# Patient Record
Sex: FEMALE | Race: WHITE | NOT HISPANIC OR LATINO | Employment: FULL TIME | URBAN - METROPOLITAN AREA
[De-identification: names, ages, dates, MRNs, and addresses within clinical notes are randomized per-mention and may not be internally consistent; named-entity substitution may affect disease eponyms.]

---

## 2017-02-14 ENCOUNTER — GENERIC CONVERSION - ENCOUNTER (OUTPATIENT)
Dept: OTHER | Facility: OTHER | Age: 22
End: 2017-02-14

## 2017-02-20 ENCOUNTER — ALLSCRIPTS OFFICE VISIT (OUTPATIENT)
Dept: PERINATAL CARE | Facility: CLINIC | Age: 22
End: 2017-02-20
Payer: COMMERCIAL

## 2017-02-20 ENCOUNTER — GENERIC CONVERSION - ENCOUNTER (OUTPATIENT)
Dept: OTHER | Facility: OTHER | Age: 22
End: 2017-02-20

## 2017-02-20 PROCEDURE — 76805 OB US >/= 14 WKS SNGL FETUS: CPT | Performed by: OBSTETRICS & GYNECOLOGY

## 2017-02-20 PROCEDURE — 76817 TRANSVAGINAL US OBSTETRIC: CPT | Performed by: OBSTETRICS & GYNECOLOGY

## 2017-04-10 ENCOUNTER — GENERIC CONVERSION - ENCOUNTER (OUTPATIENT)
Dept: OTHER | Facility: OTHER | Age: 22
End: 2017-04-10

## 2017-04-10 ENCOUNTER — ALLSCRIPTS OFFICE VISIT (OUTPATIENT)
Dept: PERINATAL CARE | Facility: CLINIC | Age: 22
End: 2017-04-10
Payer: COMMERCIAL

## 2017-04-10 PROCEDURE — 76816 OB US FOLLOW-UP PER FETUS: CPT | Performed by: OBSTETRICS & GYNECOLOGY

## 2017-06-26 ENCOUNTER — HOSPITAL ENCOUNTER (OUTPATIENT)
Facility: HOSPITAL | Age: 22
Discharge: HOME | End: 2017-06-26
Attending: OBSTETRICS & GYNECOLOGY | Admitting: OBSTETRICS & GYNECOLOGY
Payer: COMMERCIAL

## 2017-06-26 VITALS
DIASTOLIC BLOOD PRESSURE: 71 MMHG | HEART RATE: 76 BPM | TEMPERATURE: 99.1 F | SYSTOLIC BLOOD PRESSURE: 134 MMHG | RESPIRATION RATE: 20 BRPM

## 2017-06-26 DIAGNOSIS — N89.8 VAGINAL DISCHARGE DURING PREGNANCY, THIRD TRIMESTER: ICD-10-CM

## 2017-06-26 DIAGNOSIS — Z3A.36 36 WEEKS GESTATION OF PREGNANCY: ICD-10-CM

## 2017-06-26 DIAGNOSIS — O26.893 VAGINAL DISCHARGE DURING PREGNANCY, THIRD TRIMESTER: ICD-10-CM

## 2017-06-26 PROCEDURE — G0463 HOSPITAL OUTPT CLINIC VISIT: HCPCS

## 2017-06-26 PROCEDURE — 99213 OFFICE O/P EST LOW 20 MIN: CPT

## 2017-06-28 ENCOUNTER — ANESTHESIA EVENT (OUTPATIENT)
Dept: LABOR AND DELIVERY | Facility: HOSPITAL | Age: 22
End: 2017-06-28
Payer: COMMERCIAL

## 2017-06-28 ENCOUNTER — ANESTHESIA (OUTPATIENT)
Dept: LABOR AND DELIVERY | Facility: HOSPITAL | Age: 22
End: 2017-06-28
Payer: COMMERCIAL

## 2017-06-28 ENCOUNTER — HOSPITAL ENCOUNTER (INPATIENT)
Facility: HOSPITAL | Age: 22
LOS: 3 days | Discharge: HOME | End: 2017-07-01
Attending: OBSTETRICS & GYNECOLOGY | Admitting: OBSTETRICS & GYNECOLOGY
Payer: COMMERCIAL

## 2017-06-28 DIAGNOSIS — Z3A.36 36 WEEKS GESTATION OF PREGNANCY: Primary | ICD-10-CM

## 2017-06-28 LAB
ABO GROUP BLD: NORMAL
BASOPHILS # BLD AUTO: 0.01 THOUSANDS/ΜL (ref 0–0.1)
BASOPHILS NFR BLD AUTO: 0 % (ref 0–1)
BLD GP AB SCN SERPL QL: NEGATIVE
EOSINOPHIL # BLD AUTO: 0.04 THOUSAND/ΜL (ref 0–0.61)
EOSINOPHIL NFR BLD AUTO: 0 % (ref 0–6)
ERYTHROCYTE [DISTWIDTH] IN BLOOD BY AUTOMATED COUNT: 13.5 % (ref 11.6–15.1)
EXTERNAL GROUP B STREP ANTIGEN: POSITIVE
HCT VFR BLD AUTO: 36.7 % (ref 34.8–46.1)
HGB BLD-MCNC: 12.7 G/DL (ref 11.5–15.4)
LYMPHOCYTES # BLD AUTO: 1.68 THOUSANDS/ΜL (ref 0.6–4.47)
LYMPHOCYTES NFR BLD AUTO: 12 % (ref 14–44)
MCH RBC QN AUTO: 31.8 PG (ref 26.8–34.3)
MCHC RBC AUTO-ENTMCNC: 34.6 G/DL (ref 31.4–37.4)
MCV RBC AUTO: 92 FL (ref 82–98)
MONOCYTES # BLD AUTO: 0.57 THOUSAND/ΜL (ref 0.17–1.22)
MONOCYTES NFR BLD AUTO: 4 % (ref 4–12)
NEUTROPHILS # BLD AUTO: 11.23 THOUSANDS/ΜL (ref 1.85–7.62)
NEUTS SEG NFR BLD AUTO: 84 % (ref 43–75)
NRBC BLD AUTO-RTO: 0 /100 WBCS
PLATELET # BLD AUTO: 207 THOUSANDS/UL (ref 149–390)
PMV BLD AUTO: 11.3 FL (ref 8.9–12.7)
RBC # BLD AUTO: 4 MILLION/UL (ref 3.81–5.12)
RH BLD: POSITIVE
SPECIMEN EXPIRATION DATE: NORMAL
WBC # BLD AUTO: 13.62 THOUSAND/UL (ref 4.31–10.16)

## 2017-06-28 PROCEDURE — 86901 BLOOD TYPING SEROLOGIC RH(D): CPT | Performed by: OBSTETRICS & GYNECOLOGY

## 2017-06-28 PROCEDURE — 86850 RBC ANTIBODY SCREEN: CPT | Performed by: OBSTETRICS & GYNECOLOGY

## 2017-06-28 PROCEDURE — 86900 BLOOD TYPING SEROLOGIC ABO: CPT | Performed by: OBSTETRICS & GYNECOLOGY

## 2017-06-28 PROCEDURE — 86592 SYPHILIS TEST NON-TREP QUAL: CPT | Performed by: OBSTETRICS & GYNECOLOGY

## 2017-06-28 PROCEDURE — 85025 COMPLETE CBC W/AUTO DIFF WBC: CPT | Performed by: OBSTETRICS & GYNECOLOGY

## 2017-06-28 RX ORDER — SODIUM CHLORIDE, SODIUM LACTATE, POTASSIUM CHLORIDE, CALCIUM CHLORIDE 600; 310; 30; 20 MG/100ML; MG/100ML; MG/100ML; MG/100ML
125 INJECTION, SOLUTION INTRAVENOUS CONTINUOUS
Status: DISCONTINUED | OUTPATIENT
Start: 2017-06-28 | End: 2017-06-29

## 2017-06-28 RX ORDER — PNV NO.95/FERROUS FUM/FOLIC AC 28MG-0.8MG
1 TABLET ORAL DAILY
Status: ON HOLD | COMMUNITY
End: 2017-06-28

## 2017-06-28 RX ORDER — OXYTOCIN/RINGER'S LACTATE 30/500 ML
PLASTIC BAG, INJECTION (ML) INTRAVENOUS
Status: COMPLETED
Start: 2017-06-28 | End: 2017-06-29

## 2017-06-28 RX ADMIN — SODIUM CHLORIDE, SODIUM LACTATE, POTASSIUM CHLORIDE, AND CALCIUM CHLORIDE 125 ML/HR: .6; .31; .03; .02 INJECTION, SOLUTION INTRAVENOUS at 21:31

## 2017-06-28 RX ADMIN — ROPIVACAINE HYDROCHLORIDE: 2 INJECTION, SOLUTION EPIDURAL; INFILTRATION at 22:10

## 2017-06-28 RX ADMIN — SODIUM CHLORIDE, SODIUM LACTATE, POTASSIUM CHLORIDE, AND CALCIUM CHLORIDE 125 ML/HR: .6; .31; .03; .02 INJECTION, SOLUTION INTRAVENOUS at 21:00

## 2017-06-28 RX ADMIN — CEFAZOLIN SODIUM 2000 MG: 2 SOLUTION INTRAVENOUS at 22:30

## 2017-06-29 LAB
BASE EXCESS BLDCOA CALC-SCNC: -2.5 MMOL/L (ref 3–11)
HCO3 BLDCOA-SCNC: 23.5 MMOL/L (ref 17.3–27.3)
O2 CT VFR BLDCOA CALC: 13.2 ML/DL
OXYHGB MFR BLDCOA: 63.4 %
PCO2 BLDCOA: 45.2 MM[HG] (ref 30–60)
PH BLDCOA: 7.33 [PH] (ref 7.23–7.43)
PO2 BLDCOA: 28.5 MM HG (ref 5–25)
RPR SER QL: NORMAL

## 2017-06-29 PROCEDURE — 82805 BLOOD GASES W/O2 SATURATION: CPT | Performed by: OBSTETRICS & GYNECOLOGY

## 2017-06-29 PROCEDURE — 99214 OFFICE O/P EST MOD 30 MIN: CPT

## 2017-06-29 PROCEDURE — 0HQ9XZZ REPAIR PERINEUM SKIN, EXTERNAL APPROACH: ICD-10-PCS | Performed by: OBSTETRICS & GYNECOLOGY

## 2017-06-29 PROCEDURE — G0463 HOSPITAL OUTPT CLINIC VISIT: HCPCS

## 2017-06-29 RX ORDER — DIAPER,BRIEF,INFANT-TODD,DISP
1 EACH MISCELLANEOUS 4 TIMES DAILY PRN
Status: DISCONTINUED | OUTPATIENT
Start: 2017-06-29 | End: 2017-07-01 | Stop reason: HOSPADM

## 2017-06-29 RX ORDER — ACETAMINOPHEN 325 MG/1
650 TABLET ORAL EVERY 6 HOURS PRN
Status: DISCONTINUED | OUTPATIENT
Start: 2017-06-29 | End: 2017-07-01 | Stop reason: HOSPADM

## 2017-06-29 RX ORDER — OXYCODONE HYDROCHLORIDE AND ACETAMINOPHEN 5; 325 MG/1; MG/1
1 TABLET ORAL EVERY 4 HOURS PRN
Status: DISCONTINUED | OUTPATIENT
Start: 2017-06-29 | End: 2017-07-01 | Stop reason: HOSPADM

## 2017-06-29 RX ORDER — IBUPROFEN 600 MG/1
600 TABLET ORAL EVERY 6 HOURS PRN
Status: DISCONTINUED | OUTPATIENT
Start: 2017-06-29 | End: 2017-07-01 | Stop reason: HOSPADM

## 2017-06-29 RX ORDER — CALCIUM CARBONATE 200(500)MG
500 TABLET,CHEWABLE ORAL 3 TIMES DAILY PRN
Status: DISCONTINUED | OUTPATIENT
Start: 2017-06-29 | End: 2017-07-01 | Stop reason: HOSPADM

## 2017-06-29 RX ORDER — METHYLERGONOVINE MALEATE 0.2 MG/ML
INJECTION INTRAVENOUS
Status: COMPLETED
Start: 2017-06-29 | End: 2017-06-29

## 2017-06-29 RX ORDER — OXYCODONE HYDROCHLORIDE AND ACETAMINOPHEN 5; 325 MG/1; MG/1
2 TABLET ORAL EVERY 4 HOURS PRN
Status: DISCONTINUED | OUTPATIENT
Start: 2017-06-29 | End: 2017-07-01 | Stop reason: HOSPADM

## 2017-06-29 RX ORDER — ONDANSETRON 2 MG/ML
4 INJECTION INTRAMUSCULAR; INTRAVENOUS EVERY 8 HOURS PRN
Status: DISCONTINUED | OUTPATIENT
Start: 2017-06-29 | End: 2017-07-01 | Stop reason: HOSPADM

## 2017-06-29 RX ORDER — METHYLERGONOVINE MALEATE 0.2 MG/ML
0.2 INJECTION INTRAVENOUS ONCE
Status: COMPLETED | OUTPATIENT
Start: 2017-06-29 | End: 2017-06-29

## 2017-06-29 RX ORDER — DIPHENHYDRAMINE HCL 25 MG
25 TABLET ORAL EVERY 6 HOURS PRN
Status: DISCONTINUED | OUTPATIENT
Start: 2017-06-29 | End: 2017-07-01 | Stop reason: HOSPADM

## 2017-06-29 RX ORDER — DOCUSATE SODIUM 100 MG/1
100 CAPSULE, LIQUID FILLED ORAL 2 TIMES DAILY
Status: DISCONTINUED | OUTPATIENT
Start: 2017-06-29 | End: 2017-07-01 | Stop reason: HOSPADM

## 2017-06-29 RX ADMIN — DOCUSATE SODIUM 100 MG: 100 CAPSULE, LIQUID FILLED ORAL at 20:51

## 2017-06-29 RX ADMIN — Medication 30 UNITS: at 02:21

## 2017-06-29 RX ADMIN — BENZOCAINE AND MENTHOL: 20; .5 SPRAY TOPICAL at 05:05

## 2017-06-29 RX ADMIN — METHYLERGONOVINE MALEATE 0.2 MG: 0.2 INJECTION INTRAVENOUS at 02:30

## 2017-06-29 RX ADMIN — METHYLERGONOVINE MALEATE 0.2 MG: 0.2 INJECTION, SOLUTION INTRAMUSCULAR; INTRAVENOUS at 02:30

## 2017-06-29 RX ADMIN — IBUPROFEN 600 MG: 600 TABLET, FILM COATED ORAL at 20:49

## 2017-06-30 RX ORDER — DOCUSATE SODIUM 100 MG/1
100 CAPSULE, LIQUID FILLED ORAL 2 TIMES DAILY
Qty: 10 CAPSULE | Refills: 0 | Status: SHIPPED | OUTPATIENT
Start: 2017-06-30 | End: 2019-05-02 | Stop reason: ALTCHOICE

## 2017-06-30 RX ORDER — DIAPER,BRIEF,INFANT-TODD,DISP
1 EACH MISCELLANEOUS 4 TIMES DAILY PRN
Qty: 30 G | Refills: 0 | Status: SHIPPED | OUTPATIENT
Start: 2017-06-30 | End: 2019-05-02 | Stop reason: ALTCHOICE

## 2017-06-30 RX ADMIN — Medication 1 TABLET: at 09:13

## 2017-06-30 RX ADMIN — DOCUSATE SODIUM 100 MG: 100 CAPSULE, LIQUID FILLED ORAL at 17:55

## 2017-06-30 RX ADMIN — DOCUSATE SODIUM 100 MG: 100 CAPSULE, LIQUID FILLED ORAL at 09:13

## 2017-06-30 RX ADMIN — WITCH HAZEL 1 PAD: 500 SOLUTION RECTAL; TOPICAL at 09:13

## 2017-06-30 RX ADMIN — HYDROCORTISONE 1 APPLICATION: 1 CREAM TOPICAL at 09:13

## 2017-07-01 VITALS
HEIGHT: 63 IN | OXYGEN SATURATION: 99 % | WEIGHT: 152 LBS | RESPIRATION RATE: 18 BRPM | DIASTOLIC BLOOD PRESSURE: 64 MMHG | BODY MASS INDEX: 26.93 KG/M2 | HEART RATE: 58 BPM | TEMPERATURE: 98.3 F | SYSTOLIC BLOOD PRESSURE: 143 MMHG

## 2017-07-01 RX ADMIN — Medication 1 TABLET: at 08:33

## 2017-07-01 RX ADMIN — DOCUSATE SODIUM 100 MG: 100 CAPSULE, LIQUID FILLED ORAL at 08:33

## 2017-07-07 LAB — PLACENTA IN STORAGE: NORMAL

## 2017-07-19 ENCOUNTER — TELEPHONE (OUTPATIENT)
Dept: NURSERY | Facility: HOSPITAL | Age: 22
End: 2017-07-19

## 2017-11-02 ENCOUNTER — ALLSCRIPTS OFFICE VISIT (OUTPATIENT)
Dept: OTHER | Facility: OTHER | Age: 22
End: 2017-11-02

## 2017-11-03 NOTE — PROGRESS NOTES
Assessment  1  Depression (311) (F32 9)   2  Body mass index (BMI) 21 0-21 9, adult (V85 1) (Z68 21)    Plan  Acute lower UTI, Body mass index (BMI) 21 0-21 9, adult    · Psychology Referral Other Co-Management  *  Status: Hold For - Scheduling  Requested  for: 15JEH2414  are Referring to a non- Preferred Provider : Provider not listed in Allscripts  Care Summary provided  : Yes  Depression    · Escitalopram Oxalate 10 MG Oral Tablet; Take 1 tablet daily   · Follow-up visit in 1 month Evaluation and Treatment  Follow-up  Status: Hold For -  Scheduling  Requested for: [id number]    Discussion/Summary    She was referred to [facility] in [facility] for counselling  SSRI as above  She was advised on possible side effects and was asked to call for any side effects or worsening symptoms prior to follow up appointment  visit in one month  Chief Complaint  migraines, depression, anxiety for the past few months rmklpn      History of Present Illness  HPI: Had a baby 4 months ago  prior to that, few months prior, had been feeling tearful and depressed  Did not tell her ob about this  Denies SI/HI but sometimes she feels she would be better off if things just ended  Would not hurt her baby, states she is the only thing that makes her happy  Feels tearful, down and sad, not able to initiate sleep, getting out to do things but just not happy  been depressed in the past but never treated  Has family history of depression  This started prior to OCP's and did not get worse with starting these  Review of Systems    Constitutional: No fever, no chills, feels well, no tiredness, no recent weight gain or loss  Active Problems  1  Acute lower UTI (599 0) (N39 0)   2  Body mass index (BMI) 21 0-21 9, adult (V85 1) (Z68 21)   3  Encounter for fetal anatomic survey (V28 81) (Z36 89)   4  Encounter for screening for risk of pre-term labor (V28 82) (Z36 86)   5  Need for influenza vaccination (V04 81) (Z23)   6   POTS (postural orthostatic tachycardia syndrome) (427 89) (R00 0,I95 1)   7  History of Uses birth control (V25 9) (Z30 9)    Past Medical History  1  History of Acute bronchitis due to other specified organisms (466 0) (J20 8)   2  History of Acute lower UTI (599 0) (N39 0)   3  History of Acute upper respiratory infection (465 9) (J06 9)   4  History of Cough (786 2) (R05)   5  History of Cough (786 2) (R05)   6  History of Encounter for routine child health examination without abnormal findings   (V20 2) (Z00 129)   7  History of Fever (On Exam) (780 60)   8  History of acute gastritis (V12 70) (Z87 19)   9  History of acute pharyngitis (V12 69) (Z87 09)   10  History of vomiting (V13 89) (Z87 898)   11  History of Near syncope (780 2) (R55)   12  History of Otitis media, unspecified laterality   13  History of Palpitation (785 1) (R00 2)   14  History of Severe menstrual cramps (625 3) (N94 6)  Active Problems And Past Medical History Reviewed: The active problems and past medical history were reviewed and updated today  Family History  Mother    1  Family history of Anxiety   2  Family history of anemia (V18 2) (Z83 2)   3  Family history of diabetes mellitus (V18 0) (Z83 3)   4  Family history of hypertension (V17 49) (Z82 49)   5  Family history of hypotension (V17 49) (Z82 49)   6  Family history of malignant neoplasm (V16 9) (Z80 9)  Father    7  Family history of Bipolar depression   8  Family history of arthritis (V17 7) (Z82 61)   9  Family history of diabetes mellitus (V18 0) (Z83 3)   10  Family history of hypertension (V17 49) (Z82 49)   11  Family history of High cholesterol  Maternal Grandmother    12  Family history of hypertension (V17 49) (Z82 49)  Paternal Grandmother    15  Family history of diabetes mellitus (V18 0) (Z83 3)  Family History    14  Family history of cerebrovascular accident (CVA) (V17 1) (Z82 3)  Family History Reviewed: The family history was reviewed and updated today  Social History   · Exercise habits   · Lives with parents   · Never a smoker   · No alcohol use   · Pets/Animals: Bird   · Pets/Animals: Dog   · Pets/Animals: Rabbit   · Single   · Sleeps 8 - 10 hours a day   · History of Uses birth control (V25 9) (Z30 9)  The social history was reviewed and updated today  The social history was reviewed and is unchanged  Surgical History  1  Denied: History Of Prior Surgery  Surgical History Reviewed: The surgical history was reviewed and updated today  Current Meds   1  Sprintec 28 0 25-35 MG-MCG Oral Tablet; Take 1 tablet daily; Therapy: [id number] to Recorded    The medication list was reviewed and updated today  Allergies  1  Penicillins  2  Seasonal    Vitals   Recorded: 92QYL5401 04:24PM   Temperature 97 6 F   Heart Rate 74   Respiration 18   Systolic 024   Diastolic 60   Height 5 ft 3 in   Weight 122 lb    BMI Calculated 21 61   BSA Calculated 1 57     Physical Exam    Constitutional   General appearance: No acute distress, well appearing and well nourished  Ears, Nose, Mouth, and Throat   Oropharynx: Normal with no erythema, edema, exudate or lesions  Pulmonary   Respiratory effort: No increased work of breathing or signs of respiratory distress  Auscultation of lungs: Clear to auscultation  Cardiovascular   Auscultation of heart: Normal rate and rhythm, normal S1 and S2, without murmurs  Examination of extremities for edema and/or varicosities: Normal     Abdomen   Abdomen: Non-tender, no masses  Liver and spleen: No hepatomegaly or splenomegaly  Lymphatic   Palpation of lymph nodes in neck: No lymphadenopathy  Psychiatric   Orientation to person, place, and time: Normal     Mood and affect: Abnormal   Mood and Affect: depressed,-- sad-- and-- tearful          Results/Data  PHQ-9 Adult Depression Screening [id number] 04:23PM User, Ramón     Test Name Result Flag Reference   PHQ-9 Adult Depression Score 15     Over the last two weeks, how often have you been bothered by any of the following problems? Little interest or pleasure in doing things: Several days - 1  Feeling down, depressed, or hopeless: Nearly every day - 3  Trouble falling or staying asleep, or sleeping too much: Nearly every day - 3  Feeling tired or having little energy: Nearly every day - 3  Poor appetite or over eating: Not at all - 0  Feeling bad about yourself - or that you are a failure or have let yourself or your family down: Several days - 1  Trouble concentrating on things, such as reading the newspaper or watching television: Nearly every day - 3  Moving or speaking so slowly that other people could have noticed  Or the opposite -  being so fidgety or restless that you have been moving around a lot more than usual: Not at all - 0  Thoughts that you would be better off dead, or of hurting yourself in some way: Several days - 1   PHQ-9 Adult Depression Screening Positive     PHQ-9 Difficulty Level Not difficult at all     PHQ-9 Severity      Moderately Severe Depression       Future Appointments    Date/Time Provider Specialty Site   12/06/2017 03:30 PM NORMA Vaughn   [id number] [employer]     Signatures   Electronically signed by : NORMA Maguire ; Nov 2 2017  4:47PM EST                       (Author)

## 2017-12-06 ENCOUNTER — ALLSCRIPTS OFFICE VISIT (OUTPATIENT)
Dept: OTHER | Facility: OTHER | Age: 22
End: 2017-12-06

## 2017-12-07 NOTE — PROGRESS NOTES
Assessment    1  Depression (311) (F32 9)   2  Body mass index (BMI) 21 0-21 9, adult (V85 1) (Z68 21)    Plan  Depression    · Escitalopram Oxalate 10 MG Oral Tablet; Take 1 tablet daily   · Follow-up visit in 6 months Evaluation and Treatment  Follow-up  Status: Hold For -Scheduling  Requested for: 54YIG0840    Discussion/Summary    Depression is much improved  continue lexapro for 6-12 months  up in 6 months, encouraged to call sooner for any questions or issues  Chief Complaint  f/u medication review rmklpn      History of Present Illness  Here for follow up of depression  much better on the lexapro  No real side effects other than some nausea in the first couple of days  better, denies SI/HI  The patient states her depression has improved since the last visit  She has no comorbid illnesses  Interval Events: started lexapro  Interval Symptoms:      Review of Systems   Constitutional: No fever, no chills, feels well, no tiredness, no recent weight gain or weight loss  Cardiovascular: No complaints of slow heart rate, no fast heart rate, no chest pain, no palpitations, no leg claudication, no lower extremity edema  Respiratory: No complaints of shortness of breath, no wheezing, no cough, no SOB on exertion, no orthopnea, no PND  Active Problems  1  Acute lower UTI (599 0) (N39 0)   2  Body mass index (BMI) 21 0-21 9, adult (V85 1) (Z68 21)   3  Depression (311) (F32 9)   4  Encounter for fetal anatomic survey (V28 81) (Z36 89)   5  Encounter for screening for risk of pre-term labor (V28 82) (Z36 86)   6  Need for influenza vaccination (V04 81) (Z23)   7  POTS (postural orthostatic tachycardia syndrome) (427 89) (R00 0,I95 1)   8  History of Uses birth control (V25 9) (Z30 9)    Past Medical History    1  History of Acute bronchitis due to other specified organisms (466 0) (J20 8)   2  History of Acute lower UTI (599 0) (N39 0)   3  History of Acute upper respiratory infection (465 9) (J06 9)   4  History of Cough (786 2) (R05)   5  History of Cough (786 2) (R05)   6  History of Encounter for routine child health examination without abnormal findings (V20 2) (Z00 129)   7  History of Fever (On Exam) (780 60)   8  History of acute gastritis (V12 70) (Z87 19)   9  History of acute pharyngitis (V12 69) (Z87 09)   10  History of vomiting (V13 89) (Z87 898)   11  History of Near syncope (780 2) (R55)   12  History of Otitis media, unspecified laterality   13  History of Palpitation (785 1) (R00 2)   14  History of Severe menstrual cramps (625 3) (N94 6)    The active problems and past medical history were reviewed and updated today  Surgical History  1  Denied: History Of Prior Surgery    The surgical history was reviewed and updated today  Family History  Mother    1  Family history of Anxiety   2  Family history of anemia (V18 2) (Z83 2)   3  Family history of diabetes mellitus (V18 0) (Z83 3)   4  Family history of hypertension (V17 49) (Z82 49)   5  Family history of hypotension (V17 49) (Z82 49)   6  Family history of malignant neoplasm (V16 9) (Z80 9)  Father    7  Family history of Bipolar depression   8  Family history of arthritis (V17 7) (Z82 61)   9  Family history of diabetes mellitus (V18 0) (Z83 3)   10  Family history of hypertension (V17 49) (Z82 49)   11  Family history of High cholesterol  Maternal Grandmother    12  Family history of hypertension (V17 49) (Z82 49)  Paternal Grandmother    15  Family history of diabetes mellitus (V18 0) (Z83 3)  Family History    14  Family history of cerebrovascular accident (CVA) (V17 1) (Z82 3)    The family history was reviewed and updated today         Social History     · Exercise habits   · Lives with parents   · Never a smoker   · No alcohol use   · Pets/Animals: Bird   · Pets/Animals: Dog   · Pets/Animals: Rabbit   · Single   · Sleeps 8 - 10 hours a day   · History of Uses birth control (V25 9) (Z30 9)  The social history was reviewed and updated today  The social history was reviewed and is unchanged  Current Meds   1  Escitalopram Oxalate 10 MG Oral Tablet; Take 1 tablet daily; Therapy: 58GVW4581 to (Last Rx:02Nov2017)  Requested for: 44MAW7682 Ordered   2  Sprintec 28 0 25-35 MG-MCG Oral Tablet; Take 1 tablet daily; Therapy: 97SIO7935 to Recorded    The medication list was reviewed and updated today  Allergies  1  Penicillins  2  Seasonal    Vitals  Vital Signs    Recorded: 97RXO3314 03:24PM   Temperature 97 6 F   Heart Rate 70   Respiration 18   Systolic 689   Diastolic 70   Height 5 ft 3 in   Weight 121 lb    BMI Calculated 21 43   BSA Calculated 1 56       Physical Exam   Constitutional  General appearance: No acute distress, well appearing and well nourished  Ears, Nose, Mouth, and Throat  Oropharynx: Normal with no erythema, edema, exudate or lesions  Pulmonary  Respiratory effort: No increased work of breathing or signs of respiratory distress  Auscultation of lungs: Clear to auscultation  Cardiovascular  Auscultation of heart: Normal rate and rhythm, normal S1 and S2, without murmurs  Examination of extremities for edema and/or varicosities: Normal    Abdomen  Abdomen: Non-tender, no masses  Liver and spleen: No hepatomegaly or splenomegaly  Lymphatic  Palpation of lymph nodes in neck: No lymphadenopathy     Psychiatric  Orientation to person, place, and time: Normal    Mood and affect: Normal          Signatures   Electronically signed by : NORMA Siddiqui ; Dec  6 2017  3:46PM EST                       (Author)

## 2018-01-10 NOTE — PROGRESS NOTES
2017         RE: Tony Adams                              To: NORMA Johnston    MR#: [id number]   : [address]: [id number]                             Fax: [phone]   (Exam #: [id number])      The LMP of this 22year old,  G2, P0-0-1-0 patient was OCT 9 2016, giving   her an MILADY of 2017 and a current gestational age of 24 weeks 1 day   by dates  A sonographic examination was performed on 2017 using   real time equipment  The ultrasound examination was performed using   abdominal technique  The patient has a BMI of 22 1  Her blood pressure   today was 125/55  Earliest ultrasound found in her record: 17   18w4d  7/15/17 MILADY      Thank you very much for your kind referral of Tony Adams to the   [facility], [facility]  in [place] on 2017 for level II ultrasound   evaluation and MFM assessment  Venkata Vargas is a 81-year-old  2 para   66 white female who is currently at 19-1/7 weeks gestation by an   estimated due date of 2017  Her prenatal course so far has been   unremarkable  Venkata Vargas has no complaints  She reports fetal movement and   denies vaginal bleeding  She did not have genetic screening obtained   earlier in the pregnancy  Venkata Vargas has a history of one first trimester spontaneous pregnancy loss  Her past medical history significant for postural tachycardia syndrome  She has been evaluated by cardiology on multiple occasions in the past   related to syncopal episodes  Maternal 2-D echocardiography was found to   be normal  Her past medical and surgical histories are otherwise   unremarkable  She currently takes no medication with the exception of a   prenatal vitamin on a daily basis  She has an allergy to penicillin  Venkata Vargas denies tobacco, alcohol, or illicit drug use during the pregnancy  The family genetic history is negative with respect to genetic   abnormalities, birth defects, or mental retardation  Her dad has   hypertension  The family medical history is otherwise negative with   respect to first degree relatives with hypertension, diabetes, venous   thromboembolism or thyroid disease  Cardiac motion was observed at 143 bpm       INDICATIONS      fetal anatomical survey      Exam Types      Transvaginal   LEVEL II      RESULTS      Fetus # 1 of 1   Vertex presentation   Placenta Location = Posterior   No placenta previa   Placenta Grade = I      MEASUREMENTS (* Included In Average GA)      AC              15 0 cm        19 weeks 6 days* (71%)   BPD              4 4 cm        19 weeks 3 days* (61%)   HC              16 2 cm        18 weeks 6 days* (45%)   Femur            2 9 cm        19 weeks 0 days* (42%)      Nuchal Fold      3 4 mm      Humerus          3 1 cm        20 weeks 1 day   (75%)      Cerebellum       2 0 cm        19 weeks 6 days   Biorbit          3 0 cm        19 weeks 3 days   CisternaMagna    4 5 mm      HC/AC           1 08   FL/AC           0 19   FL/BPD          0 65   EFW (Ac/Fl/Hc)   295 grams - 0 lbs 10 oz      THE AVERAGE GESTATIONAL AGE is 19 weeks 2 days +/- 10 days  AMNIOTIC FLUID         Largest Vertical Pocket = 4 4 cm   Amniotic Fluid: Normal      CERVICAL EVALUATION      The cervix appeared normal (Ultrasound Examination)  SUPINE      Cervical Length: 3 40 cm      OTHER TEST RESULTS           Funneling?: No             Dynamic Changes?: No        Resp  To TFP?: No                      Debris?: No      ANATOMY      Head                                    Normal   Face/Neck                               See Details   Th  Cav  Normal   Heart                                   Normal   Abd  Cav  Normal   Stomach                                 Normal   Right Kidney                            Normal   Left Kidney                             Normal   Bladder                                 Normal   Abd   Nilton Levels Normal   Spine                                   See Details   Extrems                                 See Details   Genitalia                               Normal   Placenta                                Normal   Umbl  Cord                              Normal   Uterus                                  Normal   PCI                                     Normal      ANATOMY DETAILS      Visualized Appearing Sonographically Normal:   HEAD: (Calvarium, BPD Level, Cavum, Lateral Ventricles, Choroid Plexus,   Cerebellum, Cisterna Magna);    FACE/NECK: (Neck, Nuchal Fold, Orbits,   Palate);    TH  CAV  : (Lungs, Diaphragm); HEART: (Four Chamber View,   Proximal Left Outflow, Proximal Right Outflow, 3VV, 3 Vessel Trachea,   Short Axis of Greater Vessels, Ductal Arch, Aortic Arch, Interventricular   Septum, Interatrial Septum, IVC, SVC, Cardiac Axis, Cardiac Position);      ABD  CAV : (Liver);    STOMACH, RIGHT KIDNEY, LEFT KIDNEY, BLADDER, ABD  WALL, SPINE: (Thoracic Spine, Lumbar Spine, Sacrum);    EXTREMS: (Lt   Humerus, Rt Humerus, Lt Forearm, Rt Forearm, Lt Hand, Lt Femur, Rt Femur,   Lt Low Leg, Rt Low Leg, Lt Foot, Rt Foot);    GENITALIA (Female),   PLACENTA, UMBL  CORD, UTERUS, PCI      Suboptimally Visualized:   SPINE: (Cervical Spine)      Not Visualized:   FACE/NECK: (Profile, Nose/Lips, Face);    EXTREMS: (Rt Hand)      ADNEXA      The left ovary appeared normal and measured 2 1 x 2 0 x 1 0 cm with a   volume of 2 2 cc  The right ovary was not visualized  IMPRESSION      Duncan IUP   19 weeks and 2 days by this ultrasound  (MILADY=JUL 15 2017)   Vertex presentation   Regular fetal heart rate of 143 bpm   Posterior placenta   No placenta previa      GENERAL COMMENT      No fetal structural abnormality or ultrasound marker for aneuploidy is   identified on the Level II ultrasound study today   Suboptimal imaging of   the right hand, coronal view of the nose and lips, cervical spine, and   facial profile is afforded by unfavorable fetal position  Fetal growth   and amniotic fluid volume are normal   The placenta is normal in   appearance  The cervix is normal in appearance by transvaginal sonography  The   cervical length is normal   Cervical debris is not present  Cervical   funneling is not present  Neither provocative nor dynamic change is   appreciated  Today's ultrasound findings and suggested follow-up were discussed in   detail with [facility]  We discussed that prenatal ultrasound cannot rule out   all congenital abnormalities  [facility] will return to the [facility], [facility]    on April 10 to assess interval growth and evaluate anatomic targets not   imaged well today  The face to face time, in addition to time spent discussing ultrasound   results, was approximately 10 minutes, greater than 50% of which was spent   during counseling and coordination of care  BRIANNA Pimentel S , R SALINAS MORRELL S  NORMA Blas     Maternal-Fetal Medicine   Electronically signed 02/21/17 13:11

## 2018-01-13 NOTE — PROGRESS NOTES
Assessment    1  Encounter for preventive health examination (V70 0) (Z00 00)   2  Body mass index (BMI) 21 0-21 9, adult (V85 1) (Q66 45)    Discussion/Summary  health maintenance visit Currently, she eats a healthy diet and has an adequate exercise regimen  cervical cancer screening is managed by Dr Leelee Prabhakar Breast cancer screening: monthly self breast exam was advised  Patient discussion: discussed with the patient  Doing well  will give flu today  f/u as needed  paperwork completed  Possible side effects of new medications were reviewed with the patient/guardian today  The patient was counseled regarding diagnostic results, instructions for management, risk factor reductions, prognosis, patient and family education, impressions, risks and benefits of treatment options, importance of compliance with treatment  Chief Complaint  Patient presents for annual PE  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 [place]  Requesting influenza vaccine  nil/lpn      History of Present Illness  HM, Adult Female: The patient is being seen for a health maintenance evaluation  Social History: Household members include domestic partner  Work status: going to start working in [place]-- at the [employer]  The patient has never smoked cigarettes  She reports rare alcohol use  She has never used illicit drugs  General Health: The patient's health since the last visit is described as good  Lifestyle:  She consumes a diverse and healthy diet  She does not have any weight concerns  She does not use tobacco  She denies alcohol use  She denies drug use  Reproductive health:  sees gyn-- Dr Leelee Prabhakar  Screening: cancer screening reviewed and updated  metabolic screening reviewed and updated  risk screening reviewed and updated  HPI: Pt seen and examined  Doing well  no current complaints     reviewed last cardiology note      Review of Systems    Constitutional: not feeling poorly and not feeling tired  Eyes: no eyesight problems  ENT: no hearing loss  Cardiovascular: No complaints of slow heart rate, no fast heart rate, no chest pain, no palpitations, no leg claudication, no lower extremity edema  Respiratory: No complaints of shortness of breath, no wheezing, no cough, no SOB on exertion, no orthopnea, no PND  Gastrointestinal: No complaints of abdominal pain, no constipation, no nausea or vomiting, no diarrhea, no bloody stools  Genitourinary: denied  Musculoskeletal: no arthralgias and no myalgias  Integumentary: no rashes  Neurological: no headache  Psychiatric: no anxiety and no depression  Hematologic/Lymphatic: no tendency for easy bleeding and no tendency for easy bruising  Active Problems    1  Body mass index (BMI) 21 0-21 9, adult (V85 1) (Z68 21)   2  POTS (postural orthostatic tachycardia syndrome) (427 89) (R00 0,I95 1)   3   History of Uses birth control (V25 9) (Z30 9)    Past Medical History    · History of Acute bronchitis due to other specified organisms (466 0) (J20 8)   · History of Acute lower UTI (599 0) (N39 0)   · History of Acute upper respiratory infection (465 9) (J06 9)   · History of Cough (786 2) (R05)   · History of Cough (786 2) (R05)   · History of Encounter for routine child health examination without abnormal findings  (V20 2) (Z00 129)   · History of Fever (On Exam) (780 60)   · History of acute gastritis (V12 70) (Z87 19)   · History of acute pharyngitis (V12 69) (Z87 09)   · History of vomiting (V13 89) (Z87 898)   · History of Near syncope (780 2) (R55)   · History of Otitis media, unspecified laterality   · History of Palpitation (785 1) (R00 2)   · History of Severe menstrual cramps (625 3) (N94 6)    Surgical History    · Denied: History Of Prior Surgery    Family History  Mother    · Family history of anemia (V18 2) (Z83 2)   · Family history of diabetes mellitus (V18 0) (Z83 3)   · Family history of hypertension (V17 49) (Z82 49)   · Family history of hypotension (V17 49) (Z82 49)   · Family history of malignant neoplasm (V16 9) (Z80 9)  Father    · Family history of arthritis (V17 7) (Z82 61)   · Family history of diabetes mellitus (V18 0) (Z83 3)   · Family history of hypertension (V17 49) (Z82 49)   · Family history of High cholesterol  Maternal Grandmother    · Family history of hypertension (V17 49) (Z82 49)  Paternal Grandmother    · Family history of diabetes mellitus (V18 0) (Z83 3)  Family History    · Family history of cerebrovascular accident (CVA) (V17 1) (Z82 3)    Social History    · Exercise habits   · Lives with parents   · Never a smoker   · No alcohol use   · Pets/Animals: Bird   · Pets/Animals: Dog   · Pets/Animals: Rabbit   · Single   · Sleeps 8 - 10 hours a day   · History of Uses birth control (V25 9) (Z30 9)    Current Meds   1  Biotin CAPS; Therapy: (Recorded:70Emo9669) to Recorded   2  Fish Oil CAPS; Therapy: (Recorded:01Laa9627) to Recorded   3  Naproxen Sodium 550 MG Oral Tablet; TAKE 1 TABLET EVERY 12 HOURS DAILY; Therapy: 23XFR8458 to (Evaluate:30Apr2016); Last Rx:31Mar2016 Ordered   4  Sodium Chloride 1 GM Oral Tablet; Take one tablet twice a day; Therapy: 78JCS2946 to (Evaluate:37Jxl6920)  Requested for: 64PXN7911; Last   Rx:10Feb2016 Ordered   5  Sprintec 28 0 25-35 MG-MCG Oral Tablet; TAKE 1 TABLET DAILY; Therapy: 95NWG6979 to (Evaluate:26Mar2017); Last Rx:31Mar2016 Ordered   6  Vitamin C TABS; TAKE 1 TABLET DAILY; Therapy: (Recorded:78Yma9364) to Recorded   7  Vitamin D TABS; Take 1 tablet daily; Therapy: (Recorded:65Xpe4531) to Recorded    Allergies    1  Penicillins    Vitals   Recorded: 46ANT2652 33:79RW   Systolic 421   Diastolic 70   Heart Rate 98   Pulse Quality Normal   Respiration Quality Normal   Respiration 16   Temperature 98 5 F   Weight 130 lb      Physical Exam    Constitutional   General appearance: No acute distress, well appearing and well nourished      Head and Face Head and face: Normal     Eyes   Conjunctiva and lids: No swelling, erythema or discharge  Pupils and irises: Equal, round, reactive to light  Ears, Nose, Mouth, and Throat   External inspection of ears and nose: Normal     Otoscopic examination: Tympanic membranes translucent with normal light reflex  Canals patent without erythema  Nasal mucosa, septum, and turbinates: Normal without edema or erythema  Lips, teeth, and gums: Normal, good dentition  Oropharynx: Normal with no erythema, edema, exudate or lesions  Neck   Neck: Supple, symmetric, trachea midline, no masses  Thyroid: Normal, no thyromegaly  Pulmonary   Respiratory effort: No increased work of breathing or signs of respiratory distress  Auscultation of lungs: Clear to auscultation  Cardiovascular   Auscultation of heart: Normal rate and rhythm, normal S1 and S2, no murmurs  Carotid pulses: 2+ bilaterally  Abdominal aorta: Normal     Examination of extremities for edema and/or varicosities: Normal     Lymphatic   Palpation of lymph nodes in neck: No lymphadenopathy  Musculoskeletal   Gait and station: Normal     Digits and nails: Normal without clubbing or cyanosis  Joints, bones, and muscles: Normal     Range of motion: Normal     Stability: Normal     Muscle strength/tone: Normal     Neurologic   Cranial nerves: Cranial nerves II-XII intact  Reflexes: 2+ and symmetric  Psychiatric   Judgment and insight: Normal     Orientation to person, place, and time: Normal     Recent and remote memory: Intact      Mood and affect: Normal        Signatures   Electronically signed by : Glenna Persaud DO; Oct 27 2016  1:23PM EST                       (Author)

## 2018-01-13 NOTE — RESULT NOTES
Message   please call patient  no bacteria in cx   may stop antibiotic   if symptoms continue- should follow up in the office    RL     Verified Results  (LC) Urine Culture, Routine [id number] 12:00AM Dianne Farfan     Test Name Result Flag Reference   Urine Culture, Routine Final report     Result 1 Comment     Mixed urogenital angelica  10,000-25,000 colony forming units per mL       Signatures   Electronically signed by : Clarissa Pena DO; May 19 2016  8:19AM EST                       (Author)

## 2018-01-14 VITALS
DIASTOLIC BLOOD PRESSURE: 55 MMHG | WEIGHT: 125.4 LBS | HEIGHT: 63 IN | SYSTOLIC BLOOD PRESSURE: 125 MMHG | BODY MASS INDEX: 22.22 KG/M2

## 2018-01-14 VITALS
RESPIRATION RATE: 18 BRPM | DIASTOLIC BLOOD PRESSURE: 60 MMHG | BODY MASS INDEX: 21.62 KG/M2 | SYSTOLIC BLOOD PRESSURE: 110 MMHG | HEIGHT: 63 IN | WEIGHT: 122 LBS | TEMPERATURE: 97.6 F | HEART RATE: 74 BPM

## 2018-01-14 VITALS
DIASTOLIC BLOOD PRESSURE: 59 MMHG | WEIGHT: 133.4 LBS | SYSTOLIC BLOOD PRESSURE: 120 MMHG | BODY MASS INDEX: 23.64 KG/M2 | HEIGHT: 63 IN

## 2018-01-15 NOTE — RESULT NOTES
Verified Results  (1) CBC/ PLT (NO DIFF) [id number] 07:42AM [facility]     Test Name Result Flag Reference   WBC 5 7 x10E3/uL  3 4-10 8   RBC 4 52 x10E6/uL  3 77-5 28   Hemoglobin 13 8 g/dL  11 1-15 9   Hematocrit 41 1 %  34 0-46  6   MCV 91 fL  79-97   MCH 30 5 pg  26 6-33 0   MCHC 33 6 g/dL  31 5-35 7   RDW 12 6 %  12 3-15 4   Platelets 706 G38H7/TX  150-379     (1) COMPREHENSIVE METABOLIC PANEL [id number] 26:96WJ [facility]     Test Name Result Flag Reference   Glucose, Serum 94 mg/dL  65-99   BUN 10 mg/dL  6-20   Creatinine, Serum 0 91 mg/dL  0 57-1 00   eGFR If NonAfricn Am 92 mL/min/1 73  >59   eGFR If Africn Am 106 mL/min/1 73  >59   BUN/Creatinine Ratio 11  8-20   Sodium, Serum 141 mmol/L  134-144   Potassium, Serum 3 7 mmol/L  3 5-5 2   Chloride, Serum 103 mmol/L     Carbon Dioxide, Total 23 mmol/L  18-29   Calcium, Serum 9 4 mg/dL  8 7-10 2   Protein, Total, Serum 6 4 g/dL  6 0-8 5   Albumin, Serum 4 6 g/dL  3 5-5 5   Globulin, Total 1 8 g/dL  1 5-4 5   A/G Ratio 2 6 H 1 1-2 5   Bilirubin, Total 0 4 mg/dL  0 0-1 2   Alkaline Phosphatase, S 71 IU/L     AST (SGOT) 14 IU/L  0-40   ALT (SGPT) 11 IU/L  0-32     (1) TSH [id number] 07:42AM [facility]     Test Name Result Flag Reference   TSH 1 840 uIU/mL  0 450-4 500

## 2018-01-17 NOTE — PROGRESS NOTES
APR 10 2017         RE: Musa Ibarra                              To: NORMA Garcia    MR#: [id number]   : MAY 4 1995   ENC: [id number]                             Fax: [phone]   (Exam #: [id number])      The LMP of this 22year old,  G2, P0-0-1-0 patient was OCT 9 2016, giving   her an MILADY of 2017 and a current gestational age of 29 weeks 1 day   by dates  A sonographic examination was performed on APR 10 2017 using   real time equipment  The ultrasound examination was performed using   abdominal technique  The patient has a BMI of 23 6  Her blood pressure   today was 120/59  Earliest ultrasound found in her record: 17   18w4d  7/15/17 MILADY      Cardiac motion was observed at 149 bpm       INDICATIONS      missed anatomy follow up   fetal growth      Exam Types      Level I      RESULTS      Fetus # 1 of 1   Vertex presentation   Fetal growth appeared normal   Placenta Location = Posterior   No placenta previa   Placenta Grade = I      MEASUREMENTS (* Included In Average GA)      AC              23 5 cm        27 weeks 6 days* (77%)   BPD              6 8 cm        27 weeks 4 days* (76%)   HC              23 8 cm        25 weeks 5 days* (32%)   Femur            5 3 cm        28 weeks 2 days* (81%)      Cerebellum       3 0 cm        27 weeks 5 days      HC/AC           1 01   FL/AC           0 23   FL/BPD          0 78   EFW (Ac/Fl/Hc)  1103 grams - 2 lbs 7 oz                 (73%)      THE AVERAGE GESTATIONAL AGE is 27 weeks 2 days +/- 14 days  AMNIOTIC FLUID      Q1: 3 5      Q2: 4 4      Q3: 3 1      Q4: 3 4   LANRE Total = 14 3 cm   Amniotic Fluid: Normal      ANATOMY DETAILS      Visualized Appearing Sonographically Normal:   HEAD: (Calvarium, BPD Level, Cavum, Lateral Ventricles, Cerebellum,   Cisterna Magna);    FACE/NECK: (Profile, Nose/Lips, Face);    TH  CAV :   (Diaphragm);     HEART: (Four Chamber View, Proximal Left Outflow, Proximal   Right Outflow, 3VV, 3 Vessel Trachea, Cardiac Axis, Cardiac Position);      STOMACH, RIGHT KIDNEY, LEFT KIDNEY, BLADDER, ABD  WALL, SPINE: (Cervical   Spine, Thoracic Spine, Lumbar Spine, Sacrum);    EXTREMS: (Rt Hand);      PLACENTA      ANATOMY COMMENTS      The prior fetal anatomic survey was limited with respect to evaluation of   the cervical spine, facial profile, right hand, and coronal view of the   nose and lips  These anatomic views were seen today as sonographically   normal within the inherent limitations of fetal ultrasound  IMPRESSION      Duncan IUP   27 weeks and 2 days by this ultrasound  (MILADY=JUL 8 2017)   Vertex presentation   Fetal growth appeared normal   Regular fetal heart rate of 149 bpm   Posterior placenta   No placenta previa      GENERAL COMMENT      No fetal structural abnormality is identified on the Level I survey today  Fetal interval growth and amniotic fluid volume are normal       RECOMMENDATION      Growth Ultrasound: As indicated      Samson Dakins, R D M S Percell Closs, M D     Maternal-Fetal Medicine   Electronically signed 04/10/17 15:08

## 2018-01-23 VITALS
WEIGHT: 121 LBS | RESPIRATION RATE: 18 BRPM | BODY MASS INDEX: 21.44 KG/M2 | DIASTOLIC BLOOD PRESSURE: 70 MMHG | HEIGHT: 63 IN | TEMPERATURE: 97.6 F | HEART RATE: 70 BPM | SYSTOLIC BLOOD PRESSURE: 110 MMHG

## 2018-02-19 DIAGNOSIS — F32.A DEPRESSION, UNSPECIFIED DEPRESSION TYPE: Primary | ICD-10-CM

## 2018-02-20 RX ORDER — ESCITALOPRAM OXALATE 10 MG/1
TABLET ORAL
Qty: 30 TABLET | Refills: 3 | Status: SHIPPED | OUTPATIENT
Start: 2018-02-20 | End: 2018-06-25 | Stop reason: SDUPTHER

## 2018-06-25 DIAGNOSIS — F32.A DEPRESSION, UNSPECIFIED DEPRESSION TYPE: ICD-10-CM

## 2018-06-25 RX ORDER — ESCITALOPRAM OXALATE 10 MG/1
TABLET ORAL
Qty: 30 TABLET | Refills: 1 | Status: SHIPPED | OUTPATIENT
Start: 2018-06-25 | End: 2018-08-29 | Stop reason: SDUPTHER

## 2018-08-29 DIAGNOSIS — F32.A DEPRESSION, UNSPECIFIED DEPRESSION TYPE: ICD-10-CM

## 2018-08-30 RX ORDER — ESCITALOPRAM OXALATE 10 MG/1
TABLET ORAL
Qty: 30 TABLET | Refills: 1 | Status: SHIPPED | OUTPATIENT
Start: 2018-08-30 | End: 2018-11-03 | Stop reason: SDUPTHER

## 2018-11-03 DIAGNOSIS — F32.A DEPRESSION, UNSPECIFIED DEPRESSION TYPE: ICD-10-CM

## 2018-11-04 RX ORDER — ESCITALOPRAM OXALATE 10 MG/1
TABLET ORAL
Qty: 30 TABLET | Refills: 0 | Status: SHIPPED | OUTPATIENT
Start: 2018-11-04 | End: 2018-12-03 | Stop reason: SDUPTHER

## 2018-12-03 DIAGNOSIS — F32.A DEPRESSION, UNSPECIFIED DEPRESSION TYPE: ICD-10-CM

## 2018-12-04 RX ORDER — ESCITALOPRAM OXALATE 10 MG/1
TABLET ORAL
Qty: 15 TABLET | Refills: 0 | Status: SHIPPED | OUTPATIENT
Start: 2018-12-04 | End: 2018-12-20 | Stop reason: SDUPTHER

## 2018-12-17 DIAGNOSIS — F32.A DEPRESSION, UNSPECIFIED DEPRESSION TYPE: ICD-10-CM

## 2018-12-18 RX ORDER — ESCITALOPRAM OXALATE 10 MG/1
TABLET ORAL
Qty: 15 TABLET | Refills: 0 | OUTPATIENT
Start: 2018-12-18

## 2018-12-19 NOTE — TELEPHONE ENCOUNTER
Tried calling pt, no answer and I was unable to leave a voicemail as her mailbox has not been set up yet  If pt does call back please have her schedule a follow up appointment for refills  Thank you   Joe Olsen

## 2018-12-20 ENCOUNTER — TELEPHONE (OUTPATIENT)
Dept: FAMILY MEDICINE CLINIC | Facility: CLINIC | Age: 23
End: 2018-12-20

## 2018-12-20 DIAGNOSIS — F32.A DEPRESSION, UNSPECIFIED DEPRESSION TYPE: Primary | ICD-10-CM

## 2018-12-20 RX ORDER — ESCITALOPRAM OXALATE 10 MG/1
10 TABLET ORAL DAILY
Qty: 15 TABLET | Refills: 0 | Status: SHIPPED | OUTPATIENT
Start: 2018-12-20 | End: 2019-01-03 | Stop reason: SDUPTHER

## 2019-01-02 NOTE — PROGRESS NOTES
Subjective:      Patient ID: Isabel Cruz is a 26 y o  female  Chief Complaint   Patient presents with    Depression     [facility]       Here for follow up of depression on escitalopram   Feels well on this  Sleeps well on the lexapro  No side effects  Does not want to try to taper at this time  The following portions of the patient's history were reviewed and updated as appropriate: allergies, current medications, past family history, past medical history, past social history, past surgical history and problem list     Review of Systems   Constitutional: Negative  Respiratory: Negative  Cardiovascular: Negative  Psychiatric/Behavioral: Negative for dysphoric mood and sleep disturbance  Current Outpatient Prescriptions   Medication Sig Dispense Refill    Aspirin 81 MG EC tablet Take 1 tablet by mouth daily      escitalopram (LEXAPRO) 10 mg tablet Take 1 tablet (10 mg total) by mouth daily 90 tablet 2    hydrocortisone 1 % cream Apply 1 application topically 4 (four) times a day as needed for irritation 30 g 0    norgestimate-ethinyl estradiol (SPRINTEC 28) 0 25-35 MG-MCG per tablet Take 1 tablet by mouth daily      benzocaine-menthol-lanolin-aloe (DERMOPLAST) 20-0 5 % topical spray Apply 1 application topically 4 (four) times a day as needed for irritation (Patient not taking: Reported on 1/3/2019 )  0    docusate sodium (COLACE) 100 mg capsule Take 1 capsule by mouth 2 (two) times a day (Patient not taking: Reported on 1/3/2019 ) 10 capsule 0    Prenatal Vit-Fe Fumarate-FA (PRENATAL VITAMIN PO) Take 1 tablet by mouth daily      witch hazel-glycerin (TUCKS) topical pad Apply 1 pad topically as needed for irritation (Patient not taking: Reported on 1/3/2019 ) 40 each 0     No current facility-administered medications for this visit          Objective:    /78   Pulse 76   Temp (!) 96 8 °F (36 °C)   Resp 16   Ht 5' 3" (1 6 m)   Wt 57 8 kg (127 lb 6 4 oz)   LMP 12/30/2018   BMI 22 57 kg/m²        Physical Exam   Constitutional: She appears well-developed and well-nourished  Eyes: Conjunctivae are normal    Neck: Neck supple  No thyromegaly present  Cardiovascular: Normal rate, regular rhythm, normal heart sounds and intact distal pulses  Exam reveals no gallop and no friction rub  No murmur heard  Pulmonary/Chest: Effort normal and breath sounds normal  She has no wheezes  She has no rales  Abdominal: There is no tenderness  Musculoskeletal: She exhibits no edema  Psychiatric: She has a normal mood and affect  Her behavior is normal  Judgment and thought content normal          Assessment/Plan:    Depression  Stable and well controlled on escitalopram   Will continue  Advised follow up every 6 months for medication review  Diagnoses and all orders for this visit:    Depression, unspecified depression type  -     escitalopram (LEXAPRO) 10 mg tablet; Take 1 tablet (10 mg total) by mouth daily    Needs flu shot  -     SYRINGE/SINGLE-DOSE VIAL: influenza vaccine, [id number], quadrivalent, 0 5 mL, preservative-free (AFLURIA, FLUARIX, FLULAVAL, FLUZONE)          No Follow-up on file         Townsend Schlatter, MD

## 2019-01-03 ENCOUNTER — OFFICE VISIT (OUTPATIENT)
Dept: FAMILY MEDICINE CLINIC | Facility: CLINIC | Age: 24
End: 2019-01-03
Payer: COMMERCIAL

## 2019-01-03 VITALS
WEIGHT: 127.4 LBS | HEIGHT: 63 IN | HEART RATE: 76 BPM | RESPIRATION RATE: 16 BRPM | TEMPERATURE: 96.8 F | DIASTOLIC BLOOD PRESSURE: 78 MMHG | SYSTOLIC BLOOD PRESSURE: 124 MMHG | BODY MASS INDEX: 22.57 KG/M2

## 2019-01-03 DIAGNOSIS — F32.A DEPRESSION, UNSPECIFIED DEPRESSION TYPE: Primary | ICD-10-CM

## 2019-01-03 DIAGNOSIS — Z23 NEEDS FLU SHOT: ICD-10-CM

## 2019-01-03 PROBLEM — Z3A.36 36 WEEKS GESTATION OF PREGNANCY: Status: RESOLVED | Noted: 2017-06-28 | Resolved: 2019-01-03

## 2019-01-03 PROCEDURE — 99213 OFFICE O/P EST LOW 20 MIN: CPT | Performed by: INTERNAL MEDICINE

## 2019-01-03 PROCEDURE — 1036F TOBACCO NON-USER: CPT | Performed by: INTERNAL MEDICINE

## 2019-01-03 PROCEDURE — 3008F BODY MASS INDEX DOCD: CPT | Performed by: INTERNAL MEDICINE

## 2019-01-03 PROCEDURE — 90471 IMMUNIZATION ADMIN: CPT

## 2019-01-03 PROCEDURE — 90686 IIV4 VACC NO PRSV 0.5 ML IM: CPT

## 2019-01-03 RX ORDER — ESCITALOPRAM OXALATE 10 MG/1
10 TABLET ORAL DAILY
Qty: 90 TABLET | Refills: 2 | Status: SHIPPED | OUTPATIENT
Start: 2019-01-03 | End: 2019-10-08 | Stop reason: SDUPTHER

## 2019-01-03 NOTE — ASSESSMENT & PLAN NOTE
Stable and well controlled on escitalopram   Will continue  Advised follow up every 6 months for medication review

## 2019-05-02 ENCOUNTER — OFFICE VISIT (OUTPATIENT)
Dept: FAMILY MEDICINE CLINIC | Facility: CLINIC | Age: 24
End: 2019-05-02
Payer: COMMERCIAL

## 2019-05-02 VITALS
HEIGHT: 63 IN | RESPIRATION RATE: 16 BRPM | WEIGHT: 124 LBS | TEMPERATURE: 98.2 F | BODY MASS INDEX: 21.97 KG/M2 | DIASTOLIC BLOOD PRESSURE: 62 MMHG | HEART RATE: 82 BPM | SYSTOLIC BLOOD PRESSURE: 120 MMHG

## 2019-05-02 DIAGNOSIS — F32.A DEPRESSION, UNSPECIFIED DEPRESSION TYPE: Primary | ICD-10-CM

## 2019-05-02 PROCEDURE — 99213 OFFICE O/P EST LOW 20 MIN: CPT | Performed by: INTERNAL MEDICINE

## 2019-05-02 RX ORDER — BUPROPION HYDROCHLORIDE 150 MG/1
150 TABLET ORAL DAILY
Qty: 30 TABLET | Refills: 3 | Status: SHIPPED | OUTPATIENT
Start: 2019-05-02 | End: 2019-09-11 | Stop reason: SDUPTHER

## 2019-05-23 ENCOUNTER — OFFICE VISIT (OUTPATIENT)
Dept: FAMILY MEDICINE CLINIC | Facility: CLINIC | Age: 24
End: 2019-05-23
Payer: COMMERCIAL

## 2019-05-23 VITALS
SYSTOLIC BLOOD PRESSURE: 120 MMHG | TEMPERATURE: 97.3 F | DIASTOLIC BLOOD PRESSURE: 60 MMHG | WEIGHT: 120 LBS | RESPIRATION RATE: 16 BRPM | BODY MASS INDEX: 21.26 KG/M2 | HEIGHT: 63 IN | HEART RATE: 88 BPM

## 2019-05-23 DIAGNOSIS — F32.A DEPRESSION, UNSPECIFIED DEPRESSION TYPE: Primary | ICD-10-CM

## 2019-05-23 PROCEDURE — 1036F TOBACCO NON-USER: CPT | Performed by: INTERNAL MEDICINE

## 2019-05-23 PROCEDURE — 3008F BODY MASS INDEX DOCD: CPT | Performed by: INTERNAL MEDICINE

## 2019-05-23 PROCEDURE — 99213 OFFICE O/P EST LOW 20 MIN: CPT | Performed by: INTERNAL MEDICINE

## 2019-09-11 DIAGNOSIS — F32.A DEPRESSION, UNSPECIFIED DEPRESSION TYPE: ICD-10-CM

## 2019-09-11 RX ORDER — BUPROPION HYDROCHLORIDE 150 MG/1
TABLET ORAL
Qty: 30 TABLET | Refills: 3 | Status: SHIPPED | OUTPATIENT
Start: 2019-09-11 | End: 2020-02-20 | Stop reason: ALTCHOICE

## 2019-10-08 DIAGNOSIS — F32.A DEPRESSION, UNSPECIFIED DEPRESSION TYPE: ICD-10-CM

## 2019-10-08 RX ORDER — ESCITALOPRAM OXALATE 10 MG/1
TABLET ORAL
Qty: 90 TABLET | Refills: 0 | Status: SHIPPED | OUTPATIENT
Start: 2019-10-08 | End: 2020-01-09

## 2019-12-30 ENCOUNTER — TELEPHONE (OUTPATIENT)
Dept: OBGYN CLINIC | Facility: CLINIC | Age: 24
End: 2019-12-30

## 2019-12-30 NOTE — LETTER
December 30, 2019 12/30/2019        Dear Bret Rowell is your new obstetrical paperwork package for your upcoming appointment on     Please fill out all highlighted sections of Parts 2, 3 and 4 and complete the questionnaire and consent forms in their entirety  Please contact your insurance company to find out which lab you must use for bloodwork  Also, please bring all completed paperwork with you to your first appointment  Prior to your first appointment, please fax or email a copy of your current insurance card (both sides) in order for our office to pre-certify your insurance  Our fax number is [phone], attention Celeste Silverio or Nicolette Belgian  Email [email or web address]     Arrive at least 20 minutes prior to your appointment time      Esha Madison MA        CC: No Recipients

## 2020-01-07 ENCOUNTER — TELEPHONE (OUTPATIENT)
Dept: OBGYN CLINIC | Facility: CLINIC | Age: 25
End: 2020-01-07

## 2020-01-07 NOTE — TELEPHONE ENCOUNTER
Pt called in with increased nausea  Vomiting 2-5x per day, depending  She has tried OTC preppy pops and lozenges  Discussed with pt Vitamin B6 and Unisom doses  Advised to try that, B6 can be taken 3x daily, Erica Lawson is best taken before bed, if no improvement within a week, she is to call back

## 2020-01-09 DIAGNOSIS — F32.A DEPRESSION, UNSPECIFIED DEPRESSION TYPE: ICD-10-CM

## 2020-01-09 RX ORDER — ESCITALOPRAM OXALATE 10 MG/1
TABLET ORAL
Qty: 30 TABLET | Refills: 0 | Status: SHIPPED | OUTPATIENT
Start: 2020-01-09 | End: 2020-02-20 | Stop reason: ALTCHOICE

## 2020-01-09 NOTE — TELEPHONE ENCOUNTER
Tried calling pt to inform about refill and making an apt  Her number is not set up with a voicemail yet

## 2020-01-20 ENCOUNTER — INITIAL PRENATAL (OUTPATIENT)
Dept: OBGYN CLINIC | Facility: CLINIC | Age: 25
End: 2020-01-20

## 2020-01-20 VITALS
BODY MASS INDEX: 21.86 KG/M2 | SYSTOLIC BLOOD PRESSURE: 132 MMHG | HEIGHT: 63 IN | DIASTOLIC BLOOD PRESSURE: 58 MMHG | WEIGHT: 123.4 LBS

## 2020-01-20 DIAGNOSIS — Z34.81 PRENATAL CARE, SUBSEQUENT PREGNANCY, FIRST TRIMESTER: Primary | ICD-10-CM

## 2020-01-20 PROCEDURE — PNV: Performed by: NURSE PRACTITIONER

## 2020-01-20 NOTE — PROGRESS NOTES
INITIAL OB VISIT: Pt presents as a new patient to our office after a positive home UPT  Previous GYN Dr Marilee Maza  Medical History:Depression, POTS, chronic hypertension, not on any meds  Varicella as child  Denies any hx of MRSA  Surgical History: Palate surgery    Medications: Prenatal with DHA, Aspirin 162mg, B6, Unisom  Was previously on Lexapro 10mg stopped when became pregnant  Did not take any medications with previous pregnancy, denies any postpartum depression with previous pregnancy  Started on medication about a year after  Has follow up with PCP to discuss discontinuation of Lexapro  Social History: Denies any alcohol, smoking, or drug use in pregnancy  Does NOT have cats  Has not been outside the country in the last 6 months  OB/GYN History: Menses monthly regular  LMP: 19  G 2 P 1  F 7lb 6oz No complications in pregnancy or delivery    TVUS: Duncan IUP at 8w3d based on CRL 1 91cm (+) FHR 176bpm S=D MILADY: 2020    Denies any N/V, HA, Cramping, VB, LOF, Edema, Domestic Violence, Smoking  No FM yet  Tolerating PNV  Consent and forms signed  Sequential screen reviewed  Pap and cultures done  Pregnancy essentials guide reviewed online  Plans on breastfeeding  Rx for initial prenatal labs given, along with 24 hr urine and preeclampsia baseline labs due to history of chronic hypertension  Reviewed aspirin 162mg daily to help in prevention of preeclampsia  Referral for MFM placed  RTO 4 weeks or sooner as needed  Ultrasound Probe Disinfection    A transvaginal ultrasound was performed     Prior to use, disinfection was performed with High Level Disinfection Process (Trophon)  Probe serial number [id number] was used    HANNAH Francisco  20  11:11 AM

## 2020-01-22 LAB
C TRACH RRNA CVX QL NAA+PROBE: NEGATIVE
CYTOLOGIST CVX/VAG CYTO: NORMAL
DX ICD CODE: NORMAL
N GONORRHOEA RRNA CVX QL NAA+PROBE: NEGATIVE
OTHER STN SPEC: NORMAL
OTHER STN SPEC: NORMAL
PATH REPORT.FINAL DX SPEC: NORMAL
PATHOLOGIST CVX/VAG CYTO: NORMAL
RECOM F/U CVX/VAG CYTO: NORMAL
SL AMB NOTE:: NORMAL
SL AMB SPECIMEN ADEQUACY: NORMAL
SL AMB TEST METHODOLOGY: NORMAL

## 2020-01-23 LAB
BACTERIA GENITAL AEROBE CULT: NORMAL
Lab: NORMAL

## 2020-02-19 LAB
ABO GROUP BLD: NORMAL
ALBUMIN SERPL-MCNC: 4.3 G/DL (ref 3.9–5)
ALBUMIN/GLOB SERPL: 2.3 {RATIO} (ref 1.2–2.2)
ALP SERPL-CCNC: 57 IU/L (ref 39–117)
ALT SERPL-CCNC: 11 IU/L (ref 0–32)
AST SERPL-CCNC: 16 IU/L (ref 0–40)
BACTERIA UR CULT: NORMAL
BASOPHILS # BLD AUTO: 0 X10E3/UL (ref 0–0.2)
BASOPHILS NFR BLD AUTO: 0 %
BILIRUB SERPL-MCNC: 0.3 MG/DL (ref 0–1.2)
BLD GP AB SCN SERPL QL: NEGATIVE
BUN SERPL-MCNC: 7 MG/DL (ref 6–20)
BUN/CREAT SERPL: 11 (ref 9–23)
CALCIUM SERPL-MCNC: 9.2 MG/DL (ref 8.7–10.2)
CHLORIDE SERPL-SCNC: 99 MMOL/L (ref 96–106)
CO2 SERPL-SCNC: 19 MMOL/L (ref 20–29)
CREAT SERPL-MCNC: 0.63 MG/DL (ref 0.57–1)
EOSINOPHIL # BLD AUTO: 0.1 X10E3/UL (ref 0–0.4)
EOSINOPHIL NFR BLD AUTO: 1 %
ERYTHROCYTE [DISTWIDTH] IN BLOOD BY AUTOMATED COUNT: 13.5 % (ref 11.7–15.4)
GLOBULIN SER-MCNC: 1.9 G/DL (ref 1.5–4.5)
GLUCOSE SERPL-MCNC: 82 MG/DL (ref 65–99)
HBV SURFACE AG SERPL QL IA: NEGATIVE
HCT VFR BLD AUTO: 33.3 % (ref 34–46.6)
HCV AB S/CO SERPL IA: <0.1 S/CO RATIO (ref 0–0.9)
HGB BLD-MCNC: 11.6 G/DL (ref 11.1–15.9)
HIV 1+2 AB+HIV1 P24 AG SERPL QL IA: NON REACTIVE
IMM GRANULOCYTES # BLD: 0 X10E3/UL (ref 0–0.1)
IMM GRANULOCYTES NFR BLD: 1 %
LDH SERPL-CCNC: 186 IU/L (ref 119–226)
LYMPHOCYTES # BLD AUTO: 1.5 X10E3/UL (ref 0.7–3.1)
LYMPHOCYTES NFR BLD AUTO: 19 %
Lab: NORMAL
MCH RBC QN AUTO: 30.1 PG (ref 26.6–33)
MCHC RBC AUTO-ENTMCNC: 34.8 G/DL (ref 31.5–35.7)
MCV RBC AUTO: 86 FL (ref 79–97)
MONOCYTES # BLD AUTO: 0.3 X10E3/UL (ref 0.1–0.9)
MONOCYTES NFR BLD AUTO: 4 %
NEUTROPHILS # BLD AUTO: 5.7 X10E3/UL (ref 1.4–7)
NEUTROPHILS NFR BLD AUTO: 75 %
PLATELET # BLD AUTO: 292 X10E3/UL (ref 150–450)
POTASSIUM SERPL-SCNC: 4.2 MMOL/L (ref 3.5–5.2)
PROT SERPL-MCNC: 6.2 G/DL (ref 6–8.5)
RBC # BLD AUTO: 3.86 X10E6/UL (ref 3.77–5.28)
RH BLD: POSITIVE
RPR SER QL: NON REACTIVE
RUBV IGG SERPL IA-ACNC: 1.94 INDEX
SL AMB EGFR AFRICAN AMERICAN: 146 ML/MIN/1.73
SL AMB EGFR NON AFRICAN AMERICAN: 127 ML/MIN/1.73
SODIUM SERPL-SCNC: 136 MMOL/L (ref 134–144)
URATE SERPL-MCNC: 2.5 MG/DL (ref 2.5–7.1)
WBC # BLD AUTO: 7.6 X10E3/UL (ref 3.4–10.8)

## 2020-02-20 ENCOUNTER — ROUTINE PRENATAL (OUTPATIENT)
Dept: OBGYN CLINIC | Facility: CLINIC | Age: 25
End: 2020-02-20

## 2020-02-20 VITALS
SYSTOLIC BLOOD PRESSURE: 124 MMHG | WEIGHT: 126 LBS | DIASTOLIC BLOOD PRESSURE: 72 MMHG | BODY MASS INDEX: 22.32 KG/M2 | HEIGHT: 63 IN

## 2020-02-20 DIAGNOSIS — Z3A.13 13 WEEKS GESTATION OF PREGNANCY: Primary | ICD-10-CM

## 2020-02-20 PROCEDURE — PNV: Performed by: OBSTETRICS & GYNECOLOGY

## 2020-02-22 LAB
PROT 24H UR-MRATE: 84 MG/24 HR (ref 30–150)
PROT UR-MCNC: 6.7 MG/DL

## 2020-02-27 ENCOUNTER — TELEPHONE (OUTPATIENT)
Dept: PERINATAL CARE | Facility: OTHER | Age: 25
End: 2020-02-27

## 2020-02-27 NOTE — TELEPHONE ENCOUNTER
We have called patient multiple times, Voicemail not set up in order for us to leave any messages  Unable to schedule her appt

## 2020-03-11 NOTE — PROGRESS NOTES
Assessment/Plan:    1  Depression, unspecified depression type    2  Face lesion  -     Ambulatory referral to Dermatology; Future    3  Need for vaccination  -     influenza vaccine, [id number], quadrivalent, 0 5 mL, preservative-free, for adult and pediatric patients 6 mos+ (AFLURIA, [address], FLULAVAL, 2 [facility])            Patient Instructions   Please call Affiliates in [address] [place] [address] Drive for counselling at [phone]  Return if symptoms worsen or fail to improve  Subjective:      Patient ID: Nel Recinos is a 22 y o  female  Chief Complaint   Patient presents with    Depression     [facility]       Here to discuss her history of depression  She does not feel too bad right now  She occasionally has bad days but for the most part things are good  She is 13 weeks pregnant and does not want to take any medications if she does not have to  She is agreeable to counselling  She has no trouble sleeping, is eating well, denies anhedonia  There is no SI/HI  Has a mole on L chin "all her life" but this has recently grown and she would like to see dermatology  The following portions of the patient's history were reviewed and updated as appropriate: allergies, current medications, past family history, past medical history, past social history, past surgical history and problem list     Review of Systems   Constitutional: Negative  Respiratory: Negative  Cardiovascular: Negative  Psychiatric/Behavioral: Positive for dysphoric mood  Current Outpatient Medications   Medication Sig Dispense Refill    Prenatal Vit-Fe Fumarate-FA (PRENATAL FA PO) Take by mouth       No current facility-administered medications for this visit  Objective:    /64   Pulse 94   Temp (!) 97 4 °F (36 3 °C)   Resp 16   Ht 5' 3" (1 6 m)   Wt 58 1 kg (128 lb)   LMP 11/18/2019   SpO2 99%   BMI 22 67 kg/m²        Physical Exam   Constitutional: She appears well-developed and well-nourished  Eyes: Conjunctivae are normal    Neck: Neck supple  No JVD present  No thyromegaly present  Cardiovascular: Normal rate, regular rhythm, normal heart sounds and intact distal pulses  Exam reveals no gallop and no friction rub  No murmur heard  Pulmonary/Chest: Effort normal and breath sounds normal  She has no wheezes  She has no rales  Abdominal: Soft  Bowel sounds are normal  She exhibits no distension  There is no tenderness  Musculoskeletal: She exhibits no edema  Psychiatric: She has a normal mood and affect  Her behavior is normal  Judgment and thought content normal  She expresses no homicidal and no suicidal ideation  She expresses no suicidal plans and no homicidal plans                Rosalind Barron MD

## 2020-03-12 ENCOUNTER — OFFICE VISIT (OUTPATIENT)
Dept: FAMILY MEDICINE CLINIC | Facility: CLINIC | Age: 25
End: 2020-03-12
Payer: COMMERCIAL

## 2020-03-12 VITALS
HEIGHT: 63 IN | WEIGHT: 128 LBS | OXYGEN SATURATION: 99 % | RESPIRATION RATE: 16 BRPM | BODY MASS INDEX: 22.68 KG/M2 | TEMPERATURE: 97.4 F | DIASTOLIC BLOOD PRESSURE: 64 MMHG | HEART RATE: 94 BPM | SYSTOLIC BLOOD PRESSURE: 122 MMHG

## 2020-03-12 DIAGNOSIS — F32.A DEPRESSION, UNSPECIFIED DEPRESSION TYPE: Primary | ICD-10-CM

## 2020-03-12 DIAGNOSIS — Z23 NEED FOR VACCINATION: ICD-10-CM

## 2020-03-12 DIAGNOSIS — L98.9 FACE LESION: ICD-10-CM

## 2020-03-12 PROCEDURE — 90686 IIV4 VACC NO PRSV 0.5 ML IM: CPT

## 2020-03-12 PROCEDURE — 99213 OFFICE O/P EST LOW 20 MIN: CPT | Performed by: INTERNAL MEDICINE

## 2020-03-12 PROCEDURE — 90471 IMMUNIZATION ADMIN: CPT

## 2020-03-12 PROCEDURE — 1036F TOBACCO NON-USER: CPT | Performed by: INTERNAL MEDICINE

## 2020-03-17 ENCOUNTER — ROUTINE PRENATAL (OUTPATIENT)
Dept: OBGYN CLINIC | Facility: CLINIC | Age: 25
End: 2020-03-17

## 2020-03-17 VITALS — BODY MASS INDEX: 22.85 KG/M2 | DIASTOLIC BLOOD PRESSURE: 70 MMHG | WEIGHT: 129 LBS | SYSTOLIC BLOOD PRESSURE: 120 MMHG

## 2020-03-17 DIAGNOSIS — Z34.81 PRENATAL CARE, SUBSEQUENT PREGNANCY, FIRST TRIMESTER: Primary | ICD-10-CM

## 2020-03-17 PROCEDURE — PNV: Performed by: PHYSICIAN ASSISTANT

## 2020-03-17 NOTE — PROGRESS NOTES
Visit: Starting to feel flutters, No N/V, HA, cramping, VB, LOF, edema, domestic violence, or smoking  Tolerating PNV  Has Level II ultrasound scheduled  Continue routine prenatal care  Return to office in 4 weeks for ob check

## 2020-04-15 ENCOUNTER — TELEPHONE (OUTPATIENT)
Dept: PERINATAL CARE | Facility: CLINIC | Age: 25
End: 2020-04-15

## 2020-04-16 ENCOUNTER — ROUTINE PRENATAL (OUTPATIENT)
Dept: PERINATAL CARE | Facility: CLINIC | Age: 25
End: 2020-04-16
Payer: COMMERCIAL

## 2020-04-16 VITALS
BODY MASS INDEX: 24.34 KG/M2 | DIASTOLIC BLOOD PRESSURE: 65 MMHG | SYSTOLIC BLOOD PRESSURE: 129 MMHG | HEART RATE: 86 BPM | WEIGHT: 137.4 LBS | HEIGHT: 63 IN

## 2020-04-16 DIAGNOSIS — Z36.86 ENCOUNTER FOR ANTENATAL SCREENING FOR CERVICAL LENGTH: ICD-10-CM

## 2020-04-16 DIAGNOSIS — Z3A.13 13 WEEKS GESTATION OF PREGNANCY: ICD-10-CM

## 2020-04-16 DIAGNOSIS — O30.032 MONOCHORIONIC DIAMNIOTIC TWIN GESTATION IN SECOND TRIMESTER: ICD-10-CM

## 2020-04-16 DIAGNOSIS — Z3A.21 21 WEEKS GESTATION OF PREGNANCY: Primary | ICD-10-CM

## 2020-04-16 DIAGNOSIS — Z36.89 ENCOUNTER FOR FETAL ANATOMIC SURVEY: ICD-10-CM

## 2020-04-16 DIAGNOSIS — O43.122 VELAMENTOUS INSERTION OF UMBILICAL CORD IN SECOND TRIMESTER: ICD-10-CM

## 2020-04-16 PROCEDURE — 76811 OB US DETAILED SNGL FETUS: CPT | Performed by: OBSTETRICS & GYNECOLOGY

## 2020-04-16 PROCEDURE — 99242 OFF/OP CONSLTJ NEW/EST SF 20: CPT | Performed by: OBSTETRICS & GYNECOLOGY

## 2020-04-16 PROCEDURE — 76812 OB US DETAILED ADDL FETUS: CPT | Performed by: OBSTETRICS & GYNECOLOGY

## 2020-04-16 RX ORDER — ASPIRIN 81 MG/1
162 TABLET ORAL DAILY
Qty: 60 TABLET | Refills: 3 | Status: SHIPPED | OUTPATIENT
Start: 2020-04-16 | End: 2020-08-06 | Stop reason: ALTCHOICE

## 2020-04-17 ENCOUNTER — TELEPHONE (OUTPATIENT)
Dept: PERINATAL CARE | Facility: CLINIC | Age: 25
End: 2020-04-17

## 2020-04-17 DIAGNOSIS — O30.032 MONOCHORIONIC DIAMNIOTIC TWIN GESTATION IN SECOND TRIMESTER: Primary | ICD-10-CM

## 2020-04-20 ENCOUNTER — ROUTINE PRENATAL (OUTPATIENT)
Dept: OBGYN CLINIC | Facility: CLINIC | Age: 25
End: 2020-04-20

## 2020-04-20 VITALS — DIASTOLIC BLOOD PRESSURE: 76 MMHG | WEIGHT: 137 LBS | BODY MASS INDEX: 24.27 KG/M2 | SYSTOLIC BLOOD PRESSURE: 130 MMHG

## 2020-04-20 DIAGNOSIS — O30.032 MONOCHORIONIC DIAMNIOTIC TWIN GESTATION IN SECOND TRIMESTER: Primary | ICD-10-CM

## 2020-04-20 DIAGNOSIS — Z3A.22 22 WEEKS GESTATION OF PREGNANCY: ICD-10-CM

## 2020-04-20 PROCEDURE — PNV: Performed by: PHYSICIAN ASSISTANT

## 2020-04-20 RX ORDER — ADHESIVE BANDAGE 3/4"
BANDAGE TOPICAL
Qty: 1 EACH | Refills: 0 | Status: SHIPPED | OUTPATIENT
Start: 2020-04-20 | End: 2020-08-06 | Stop reason: ALTCHOICE

## 2020-04-27 ENCOUNTER — TELEPHONE (OUTPATIENT)
Dept: PERINATAL CARE | Facility: CLINIC | Age: 25
End: 2020-04-27

## 2020-04-28 ENCOUNTER — ROUTINE PRENATAL (OUTPATIENT)
Dept: PERINATAL CARE | Facility: OTHER | Age: 25
End: 2020-04-28
Payer: COMMERCIAL

## 2020-04-28 VITALS
HEIGHT: 63 IN | TEMPERATURE: 96.8 F | DIASTOLIC BLOOD PRESSURE: 69 MMHG | HEART RATE: 88 BPM | BODY MASS INDEX: 25.2 KG/M2 | WEIGHT: 142.2 LBS | SYSTOLIC BLOOD PRESSURE: 125 MMHG

## 2020-04-28 DIAGNOSIS — O30.032 MONOCHORIONIC DIAMNIOTIC TWIN GESTATION IN SECOND TRIMESTER: Primary | ICD-10-CM

## 2020-04-28 DIAGNOSIS — Z3A.23 23 WEEKS GESTATION OF PREGNANCY: ICD-10-CM

## 2020-04-28 PROCEDURE — 76825 ECHO EXAM OF FETAL HEART: CPT | Performed by: OBSTETRICS & GYNECOLOGY

## 2020-04-28 PROCEDURE — 76821 MIDDLE CEREBRAL ARTERY ECHO: CPT | Performed by: OBSTETRICS & GYNECOLOGY

## 2020-04-28 PROCEDURE — 93325 DOPPLER ECHO COLOR FLOW MAPG: CPT | Performed by: OBSTETRICS & GYNECOLOGY

## 2020-04-28 PROCEDURE — 1036F TOBACCO NON-USER: CPT | Performed by: OBSTETRICS & GYNECOLOGY

## 2020-04-28 PROCEDURE — 76816 OB US FOLLOW-UP PER FETUS: CPT | Performed by: OBSTETRICS & GYNECOLOGY

## 2020-04-28 PROCEDURE — 99212 OFFICE O/P EST SF 10 MIN: CPT | Performed by: OBSTETRICS & GYNECOLOGY

## 2020-05-13 ENCOUNTER — TELEPHONE (OUTPATIENT)
Dept: PERINATAL CARE | Facility: CLINIC | Age: 25
End: 2020-05-13

## 2020-05-13 DIAGNOSIS — Z34.82 ENCOUNTER FOR SUPERVISION OF NORMAL PREGNANCY IN MULTIGRAVIDA IN SECOND TRIMESTER: Primary | ICD-10-CM

## 2020-05-14 ENCOUNTER — ULTRASOUND (OUTPATIENT)
Dept: PERINATAL CARE | Facility: OTHER | Age: 25
End: 2020-05-14
Payer: COMMERCIAL

## 2020-05-14 VITALS
WEIGHT: 149.8 LBS | HEIGHT: 63 IN | BODY MASS INDEX: 26.54 KG/M2 | HEART RATE: 110 BPM | TEMPERATURE: 96 F | DIASTOLIC BLOOD PRESSURE: 50 MMHG | SYSTOLIC BLOOD PRESSURE: 116 MMHG

## 2020-05-14 DIAGNOSIS — Z3A.25 25 WEEKS GESTATION OF PREGNANCY: ICD-10-CM

## 2020-05-14 DIAGNOSIS — O30.032 MONOCHORIONIC DIAMNIOTIC TWIN GESTATION IN SECOND TRIMESTER: Primary | ICD-10-CM

## 2020-05-14 DIAGNOSIS — R03.0 ELEVATED BLOOD PRESSURE READING: ICD-10-CM

## 2020-05-14 DIAGNOSIS — Z36.89 ENCOUNTER FOR FETAL ANATOMIC SURVEY: ICD-10-CM

## 2020-05-14 PROCEDURE — 99212 OFFICE O/P EST SF 10 MIN: CPT | Performed by: OBSTETRICS & GYNECOLOGY

## 2020-05-14 PROCEDURE — 76815 OB US LIMITED FETUS(S): CPT | Performed by: OBSTETRICS & GYNECOLOGY

## 2020-05-14 PROCEDURE — 1036F TOBACCO NON-USER: CPT | Performed by: OBSTETRICS & GYNECOLOGY

## 2020-05-14 PROCEDURE — 76821 MIDDLE CEREBRAL ARTERY ECHO: CPT | Performed by: OBSTETRICS & GYNECOLOGY

## 2020-05-18 ENCOUNTER — TELEMEDICINE (OUTPATIENT)
Dept: OBGYN CLINIC | Facility: CLINIC | Age: 25
End: 2020-05-18

## 2020-05-18 VITALS — DIASTOLIC BLOOD PRESSURE: 62 MMHG | BODY MASS INDEX: 27.28 KG/M2 | SYSTOLIC BLOOD PRESSURE: 109 MMHG | WEIGHT: 154 LBS

## 2020-05-18 DIAGNOSIS — Z3A.26 26 WEEKS GESTATION OF PREGNANCY: ICD-10-CM

## 2020-05-18 DIAGNOSIS — O30.032 MONOCHORIONIC DIAMNIOTIC TWIN GESTATION IN SECOND TRIMESTER: Primary | ICD-10-CM

## 2020-05-18 PROCEDURE — PNV: Performed by: PHYSICIAN ASSISTANT

## 2020-05-22 ENCOUNTER — TELEPHONE (OUTPATIENT)
Dept: PERINATAL CARE | Facility: CLINIC | Age: 25
End: 2020-05-22

## 2020-05-26 ENCOUNTER — ROUTINE PRENATAL (OUTPATIENT)
Dept: PERINATAL CARE | Facility: CLINIC | Age: 25
End: 2020-05-26
Payer: COMMERCIAL

## 2020-05-26 VITALS
TEMPERATURE: 96.6 F | DIASTOLIC BLOOD PRESSURE: 58 MMHG | SYSTOLIC BLOOD PRESSURE: 114 MMHG | HEIGHT: 63 IN | HEART RATE: 76 BPM | WEIGHT: 150.4 LBS | BODY MASS INDEX: 26.65 KG/M2

## 2020-05-26 DIAGNOSIS — Z36.89 ENCOUNTER FOR ULTRASOUND TO CHECK FETAL GROWTH: ICD-10-CM

## 2020-05-26 DIAGNOSIS — O30.032 MONOCHORIONIC DIAMNIOTIC TWIN GESTATION IN SECOND TRIMESTER: Primary | ICD-10-CM

## 2020-05-26 PROCEDURE — 76821 MIDDLE CEREBRAL ARTERY ECHO: CPT | Performed by: OBSTETRICS & GYNECOLOGY

## 2020-05-26 PROCEDURE — 99212 OFFICE O/P EST SF 10 MIN: CPT | Performed by: OBSTETRICS & GYNECOLOGY

## 2020-05-26 PROCEDURE — 1036F TOBACCO NON-USER: CPT | Performed by: OBSTETRICS & GYNECOLOGY

## 2020-05-26 PROCEDURE — 76816 OB US FOLLOW-UP PER FETUS: CPT | Performed by: OBSTETRICS & GYNECOLOGY

## 2020-06-03 ENCOUNTER — TELEPHONE (OUTPATIENT)
Dept: PERINATAL CARE | Facility: CLINIC | Age: 25
End: 2020-06-03

## 2020-06-10 ENCOUNTER — TELEPHONE (OUTPATIENT)
Dept: PERINATAL CARE | Facility: CLINIC | Age: 25
End: 2020-06-10

## 2020-06-10 LAB
BASOPHILS # BLD AUTO: 0 X10E3/UL (ref 0–0.2)
BASOPHILS NFR BLD AUTO: 0 %
EOSINOPHIL # BLD AUTO: 0.1 X10E3/UL (ref 0–0.4)
EOSINOPHIL NFR BLD AUTO: 1 %
ERYTHROCYTE [DISTWIDTH] IN BLOOD BY AUTOMATED COUNT: 12.9 % (ref 11.7–15.4)
GLUCOSE 1H P 50 G GLC PO SERPL-MCNC: 122 MG/DL (ref 65–139)
HCT VFR BLD AUTO: 33.4 % (ref 34–46.6)
HGB BLD-MCNC: 11.7 G/DL (ref 11.1–15.9)
IMM GRANULOCYTES # BLD: 0.1 X10E3/UL (ref 0–0.1)
IMM GRANULOCYTES NFR BLD: 2 %
LYMPHOCYTES # BLD AUTO: 1.3 X10E3/UL (ref 0.7–3.1)
LYMPHOCYTES NFR BLD AUTO: 17 %
MCH RBC QN AUTO: 32.9 PG (ref 26.6–33)
MCHC RBC AUTO-ENTMCNC: 35 G/DL (ref 31.5–35.7)
MCV RBC AUTO: 94 FL (ref 79–97)
MONOCYTES # BLD AUTO: 0.4 X10E3/UL (ref 0.1–0.9)
MONOCYTES NFR BLD AUTO: 5 %
NEUTROPHILS # BLD AUTO: 6.1 X10E3/UL (ref 1.4–7)
NEUTROPHILS NFR BLD AUTO: 75 %
PLATELET # BLD AUTO: 157 X10E3/UL (ref 150–450)
RBC # BLD AUTO: 3.56 X10E6/UL (ref 3.77–5.28)
RPR SER QL: NON REACTIVE
WBC # BLD AUTO: 8 X10E3/UL (ref 3.4–10.8)

## 2020-06-11 ENCOUNTER — ULTRASOUND (OUTPATIENT)
Dept: PERINATAL CARE | Facility: OTHER | Age: 25
End: 2020-06-11
Payer: COMMERCIAL

## 2020-06-11 VITALS
DIASTOLIC BLOOD PRESSURE: 77 MMHG | HEIGHT: 63 IN | TEMPERATURE: 96.6 F | BODY MASS INDEX: 27.57 KG/M2 | WEIGHT: 155.6 LBS | SYSTOLIC BLOOD PRESSURE: 120 MMHG | HEART RATE: 76 BPM

## 2020-06-11 DIAGNOSIS — O30.033 MONOCHORIONIC DIAMNIOTIC TWIN GESTATION IN THIRD TRIMESTER: Primary | ICD-10-CM

## 2020-06-11 DIAGNOSIS — Z3A.29 29 WEEKS GESTATION OF PREGNANCY: ICD-10-CM

## 2020-06-11 PROCEDURE — 76821 MIDDLE CEREBRAL ARTERY ECHO: CPT | Performed by: OBSTETRICS & GYNECOLOGY

## 2020-06-11 PROCEDURE — 76816 OB US FOLLOW-UP PER FETUS: CPT | Performed by: OBSTETRICS & GYNECOLOGY

## 2020-06-11 PROCEDURE — 1036F TOBACCO NON-USER: CPT | Performed by: OBSTETRICS & GYNECOLOGY

## 2020-06-11 PROCEDURE — 99213 OFFICE O/P EST LOW 20 MIN: CPT | Performed by: OBSTETRICS & GYNECOLOGY

## 2020-06-12 ENCOUNTER — TELEPHONE (OUTPATIENT)
Dept: OBGYN CLINIC | Facility: CLINIC | Age: 25
End: 2020-06-12

## 2020-06-15 ENCOUNTER — ROUTINE PRENATAL (OUTPATIENT)
Dept: OBGYN CLINIC | Facility: CLINIC | Age: 25
End: 2020-06-15

## 2020-06-15 VITALS
TEMPERATURE: 97.4 F | BODY MASS INDEX: 28.03 KG/M2 | DIASTOLIC BLOOD PRESSURE: 74 MMHG | WEIGHT: 158.2 LBS | SYSTOLIC BLOOD PRESSURE: 138 MMHG | HEIGHT: 63 IN

## 2020-06-15 DIAGNOSIS — O30.033 MONOCHORIONIC DIAMNIOTIC TWIN GESTATION IN THIRD TRIMESTER: Primary | ICD-10-CM

## 2020-06-15 PROCEDURE — PNV: Performed by: PHYSICIAN ASSISTANT

## 2020-06-24 ENCOUNTER — TELEPHONE (OUTPATIENT)
Dept: PERINATAL CARE | Facility: OTHER | Age: 25
End: 2020-06-24

## 2020-06-25 ENCOUNTER — ULTRASOUND (OUTPATIENT)
Dept: PERINATAL CARE | Facility: OTHER | Age: 25
End: 2020-06-25
Payer: COMMERCIAL

## 2020-06-25 VITALS
HEIGHT: 63 IN | HEART RATE: 85 BPM | DIASTOLIC BLOOD PRESSURE: 64 MMHG | SYSTOLIC BLOOD PRESSURE: 106 MMHG | TEMPERATURE: 96.4 F | WEIGHT: 162 LBS | BODY MASS INDEX: 28.7 KG/M2

## 2020-06-25 DIAGNOSIS — O30.033 MONOCHORIONIC DIAMNIOTIC TWIN GESTATION IN THIRD TRIMESTER: Primary | ICD-10-CM

## 2020-06-25 DIAGNOSIS — Z3A.31 31 WEEKS GESTATION OF PREGNANCY: ICD-10-CM

## 2020-06-25 PROCEDURE — 76816 OB US FOLLOW-UP PER FETUS: CPT | Performed by: OBSTETRICS & GYNECOLOGY

## 2020-06-25 PROCEDURE — 99212 OFFICE O/P EST SF 10 MIN: CPT | Performed by: OBSTETRICS & GYNECOLOGY

## 2020-06-25 PROCEDURE — 1036F TOBACCO NON-USER: CPT | Performed by: OBSTETRICS & GYNECOLOGY

## 2020-06-26 ENCOUNTER — TELEPHONE (OUTPATIENT)
Dept: PERINATAL CARE | Facility: CLINIC | Age: 25
End: 2020-06-26

## 2020-06-30 ENCOUNTER — ROUTINE PRENATAL (OUTPATIENT)
Dept: OBGYN CLINIC | Facility: CLINIC | Age: 25
End: 2020-06-30

## 2020-06-30 ENCOUNTER — TELEPHONE (OUTPATIENT)
Dept: PERINATAL CARE | Facility: CLINIC | Age: 25
End: 2020-06-30

## 2020-06-30 VITALS — BODY MASS INDEX: 29.58 KG/M2 | SYSTOLIC BLOOD PRESSURE: 132 MMHG | DIASTOLIC BLOOD PRESSURE: 68 MMHG | WEIGHT: 167 LBS

## 2020-06-30 DIAGNOSIS — O12.03 EDEMA DURING PREGNANCY IN THIRD TRIMESTER: ICD-10-CM

## 2020-06-30 DIAGNOSIS — O30.033 MONOCHORIONIC DIAMNIOTIC TWIN GESTATION IN THIRD TRIMESTER: Primary | ICD-10-CM

## 2020-06-30 PROCEDURE — PNV: Performed by: NURSE PRACTITIONER

## 2020-07-01 ENCOUNTER — ULTRASOUND (OUTPATIENT)
Dept: PERINATAL CARE | Facility: CLINIC | Age: 25
End: 2020-07-01
Payer: COMMERCIAL

## 2020-07-01 VITALS
SYSTOLIC BLOOD PRESSURE: 135 MMHG | WEIGHT: 168.6 LBS | HEART RATE: 72 BPM | HEIGHT: 63 IN | DIASTOLIC BLOOD PRESSURE: 67 MMHG | BODY MASS INDEX: 29.88 KG/M2 | TEMPERATURE: 97.5 F

## 2020-07-01 DIAGNOSIS — O28.8 NST (NON-STRESS TEST) NONREACTIVE: ICD-10-CM

## 2020-07-01 DIAGNOSIS — O30.033 MONOCHORIONIC DIAMNIOTIC TWIN GESTATION IN THIRD TRIMESTER: ICD-10-CM

## 2020-07-01 DIAGNOSIS — Z3A.32 32 WEEKS GESTATION OF PREGNANCY: Primary | ICD-10-CM

## 2020-07-01 DIAGNOSIS — O13.3 GESTATIONAL HYPERTENSION, THIRD TRIMESTER: ICD-10-CM

## 2020-07-01 PROBLEM — F32.A DEPRESSION: Status: RESOLVED | Noted: 2020-04-16 | Resolved: 2020-07-01

## 2020-07-01 PROCEDURE — 99213 OFFICE O/P EST LOW 20 MIN: CPT | Performed by: OBSTETRICS & GYNECOLOGY

## 2020-07-01 PROCEDURE — 76818 FETAL BIOPHYS PROFILE W/NST: CPT | Performed by: OBSTETRICS & GYNECOLOGY

## 2020-07-01 PROCEDURE — 76815 OB US LIMITED FETUS(S): CPT | Performed by: OBSTETRICS & GYNECOLOGY

## 2020-07-01 PROCEDURE — 1036F TOBACCO NON-USER: CPT | Performed by: OBSTETRICS & GYNECOLOGY

## 2020-07-01 PROCEDURE — 76821 MIDDLE CEREBRAL ARTERY ECHO: CPT | Performed by: OBSTETRICS & GYNECOLOGY

## 2020-07-01 NOTE — LETTER
2020     Epifanio Walker    Patient: Daily Angela   YOB: 1995   Date of Visit: 2020       Hi Kyleigh Yeh and Antony Fontenot would probably benefit from seeing one of you for an upcoming visit to discuss twin delivery in detail  She has mo/di twins who are 22% discordant (B is larger)  She would like a more firm answer on whether she would be permitted to attempt vaginal delivery  Her first baby was vaginal, and they are vertex/vertex  Given mo/di twins with gestational hypertension, would deliver her by end of week 39  Thanks! Fawn Young MD        CC: MD Fawn Yin MD  2020  4:49 PM  Sign at close encounter  [facility]: Ms Jp Flores was seen today at [place] [address] for NST and TTTS check  See ultrasound report under "OB Procedures" tab  She has gestational hypertension, so would deliver 34 0/7 to 36 6/7 weeks gestation  22% twin weight discordance with twin B larger  Interested in vaginal delivery but aware this increases risk of   Encouraged to continue to discuss with obstetrician       Please don't hesitate to contact our office with any concerns or questions   -Fawn Young MD

## 2020-07-01 NOTE — PROGRESS NOTES
OFFICE VISIT: Moderate lower leg edema, pt states it is like that when she wakes up  Blood pressure 130's/80's  Denies any visual changes, epigastric pain or headaches  Will send for preeclampsia labs due to edema and hx of chronic htn  Denies any N/V, HA, Cramping, VB, LOF, Domestic Violence, Smoking  + FM  Tolerating PNV  Feels great overall  Has follow up ultrasound with [facility] tomorrow to reassess for potential twin to twin transfusion  NST: difficulty monitoring both twins on NST  Twin A (Maternal lower right quadrant): 's moderate variability, (+) accelerations (-) Decelerations  Twin B (Maternal mid left abdomen):  (Difficulty monitoring)  TOCO: Occasional, irregular contractions  Pt did not feel any contractions  Recommend pt complete NST's at [facility] due to difficulty with office set up  RTO 2 weeks for ob check or sooner as needed

## 2020-07-02 LAB
ALBUMIN SERPL-MCNC: 3.3 G/DL (ref 3.9–5)
ALBUMIN/GLOB SERPL: 2.1 {RATIO} (ref 1.2–2.2)
ALP SERPL-CCNC: 160 IU/L (ref 39–117)
ALT SERPL-CCNC: 16 IU/L (ref 0–32)
AST SERPL-CCNC: 22 IU/L (ref 0–40)
BASOPHILS # BLD AUTO: 0 X10E3/UL (ref 0–0.2)
BASOPHILS NFR BLD AUTO: 0 %
BILIRUB SERPL-MCNC: <0.2 MG/DL (ref 0–1.2)
BUN SERPL-MCNC: 7 MG/DL (ref 6–20)
BUN/CREAT SERPL: 11 (ref 9–23)
CALCIUM SERPL-MCNC: 8.6 MG/DL (ref 8.7–10.2)
CHLORIDE SERPL-SCNC: 100 MMOL/L (ref 96–106)
CO2 SERPL-SCNC: 21 MMOL/L (ref 20–29)
CREAT SERPL-MCNC: 0.62 MG/DL (ref 0.57–1)
CREAT UR-MCNC: 66 MG/DL
EOSINOPHIL # BLD AUTO: 0.1 X10E3/UL (ref 0–0.4)
EOSINOPHIL NFR BLD AUTO: 1 %
ERYTHROCYTE [DISTWIDTH] IN BLOOD BY AUTOMATED COUNT: 12.8 % (ref 11.7–15.4)
GLOBULIN SER-MCNC: 1.6 G/DL (ref 1.5–4.5)
GLUCOSE SERPL-MCNC: 76 MG/DL (ref 65–99)
HCT VFR BLD AUTO: 35 % (ref 34–46.6)
HGB BLD-MCNC: 12.3 G/DL (ref 11.1–15.9)
IMM GRANULOCYTES # BLD: 0.1 X10E3/UL (ref 0–0.1)
IMM GRANULOCYTES NFR BLD: 1 %
LYMPHOCYTES # BLD AUTO: 1.5 X10E3/UL (ref 0.7–3.1)
LYMPHOCYTES NFR BLD AUTO: 19 %
MCH RBC QN AUTO: 33 PG (ref 26.6–33)
MCHC RBC AUTO-ENTMCNC: 35.1 G/DL (ref 31.5–35.7)
MCV RBC AUTO: 94 FL (ref 79–97)
MONOCYTES # BLD AUTO: 0.4 X10E3/UL (ref 0.1–0.9)
MONOCYTES NFR BLD AUTO: 5 %
NEUTROPHILS # BLD AUTO: 5.7 X10E3/UL (ref 1.4–7)
NEUTROPHILS NFR BLD AUTO: 74 %
PLATELET # BLD AUTO: 146 X10E3/UL (ref 150–450)
POTASSIUM SERPL-SCNC: 4 MMOL/L (ref 3.5–5.2)
PROT SERPL-MCNC: 4.9 G/DL (ref 6–8.5)
PROT UR-MCNC: 7.9 MG/DL
PROT/CREAT UR: 120 MG/G CREAT (ref 0–200)
RBC # BLD AUTO: 3.73 X10E6/UL (ref 3.77–5.28)
SL AMB EGFR AFRICAN AMERICAN: 146 ML/MIN/1.73
SL AMB EGFR NON AFRICAN AMERICAN: 127 ML/MIN/1.73
SODIUM SERPL-SCNC: 135 MMOL/L (ref 134–144)
URATE SERPL-MCNC: 5.1 MG/DL (ref 2.5–7.1)
WBC # BLD AUTO: 7.8 X10E3/UL (ref 3.4–10.8)

## 2020-07-07 ENCOUNTER — ROUTINE PRENATAL (OUTPATIENT)
Dept: PERINATAL CARE | Facility: OTHER | Age: 25
End: 2020-07-07
Payer: COMMERCIAL

## 2020-07-07 ENCOUNTER — TELEPHONE (OUTPATIENT)
Dept: OBGYN CLINIC | Facility: CLINIC | Age: 25
End: 2020-07-07

## 2020-07-07 VITALS
TEMPERATURE: 95 F | BODY MASS INDEX: 31.36 KG/M2 | HEIGHT: 63 IN | HEART RATE: 72 BPM | SYSTOLIC BLOOD PRESSURE: 136 MMHG | DIASTOLIC BLOOD PRESSURE: 78 MMHG | WEIGHT: 177 LBS

## 2020-07-07 DIAGNOSIS — O30.033 MONOCHORIONIC DIAMNIOTIC TWIN GESTATION IN THIRD TRIMESTER: ICD-10-CM

## 2020-07-07 DIAGNOSIS — O13.3 GESTATIONAL HYPERTENSION, THIRD TRIMESTER: Primary | ICD-10-CM

## 2020-07-07 DIAGNOSIS — Z3A.33 33 WEEKS GESTATION OF PREGNANCY: ICD-10-CM

## 2020-07-07 DIAGNOSIS — F32.A DEPRESSION, UNSPECIFIED DEPRESSION TYPE: ICD-10-CM

## 2020-07-07 PROCEDURE — 59025 FETAL NON-STRESS TEST: CPT | Performed by: OBSTETRICS & GYNECOLOGY

## 2020-07-07 NOTE — LETTER
MRN: [id number] MRN: [id number] MRN: [id number]  : [birth year]  MILADY/GA: MILADY/GA: MILADY/GA:  Date:  Date:  Date:       MRN: [id number] MRN: [id number] MRN: [id number]  : [birth year] : 1995  MILADY/GA: MILADY/GA: MILADY/GA:  Date:  Date:  Date:       MRN: [id number] MRN: [id number] MRN: [id number]  : [birth year] : 1995  MILADY/GA: MILADY/GA: MILADY/GA:  Date:  Date:  Date:       MRN: [id number] MRN: [id number] MRN: [id number]  : 1995 : 1995 : 1995  MILADY/GA: MILADY/GA: MILADY/GA:  Date:  Date:  Date:       MRN: [id number] MRN: [id number] MRN: [id number]  : 1995 : 1995 : 1995  MILADY/GA: MILADY/GA: MILADY/GA:  Date:  Date:  Date:       MRN: [id number] MRN: [id number] MRN: [id number]  : 1995 : 1995 : 1995  MILADY/GA: MILADY/GA: MILADY/GA:  Date:  Date:  Date:       MRN: [id number] MRN: [id number] MRN: [id number]  : 1995 : 1995 : 1995  MILADY/GA: MILADY/GA: MILADY/GA:  Date:  Date:  Date:

## 2020-07-07 NOTE — TELEPHONE ENCOUNTER
Requested medication(s) are due for refill today: Yes  Patient has already received a courtesy refill: No  Other reason request has been forwarded to provider: please advise

## 2020-07-07 NOTE — LETTER
NST sleeve cover sheet    Patient name: Daily Angela  : 1995  MRN: [id number]    MILADY: Estimated Date of Delivery: 20    Obstetrician: B/L    Reason(s) for testing:  GHTN, velamentous cord insertion, mono/di twins  __________________________________________      Testing frequency:    _X__ 2x/wk  ___ 1x/wk  ___ Dopplers  ___ BPP?       Last growth scan: __________________________________________

## 2020-07-07 NOTE — TELEPHONE ENCOUNTER
----- Message from Adama Navarro MD sent at 7/6/2020  7:48 PM EDT -----  Patient needs to see either gregory or I for the next few visits - work her in for next appt asap

## 2020-07-08 ENCOUNTER — ROUTINE PRENATAL (OUTPATIENT)
Dept: OBGYN CLINIC | Facility: CLINIC | Age: 25
End: 2020-07-08

## 2020-07-08 ENCOUNTER — HOSPITAL ENCOUNTER (INPATIENT)
Facility: HOSPITAL | Age: 25
LOS: 3 days | Discharge: HOME | End: 2020-07-12
Attending: OBSTETRICS & GYNECOLOGY | Admitting: OBSTETRICS & GYNECOLOGY
Payer: COMMERCIAL

## 2020-07-08 VITALS
HEIGHT: 63 IN | SYSTOLIC BLOOD PRESSURE: 142 MMHG | BODY MASS INDEX: 31.57 KG/M2 | WEIGHT: 178.2 LBS | DIASTOLIC BLOOD PRESSURE: 88 MMHG | TEMPERATURE: 97.5 F

## 2020-07-08 DIAGNOSIS — Z3A.33 33 WEEKS GESTATION OF PREGNANCY: ICD-10-CM

## 2020-07-08 DIAGNOSIS — O30.033 MONOCHORIONIC DIAMNIOTIC TWIN GESTATION IN THIRD TRIMESTER: Primary | ICD-10-CM

## 2020-07-08 DIAGNOSIS — Z98.891 S/P CESAREAN SECTION: Primary | ICD-10-CM

## 2020-07-08 DIAGNOSIS — Z87.51 HX OF PRE-TERM LABOR: ICD-10-CM

## 2020-07-08 DIAGNOSIS — O32.0XX2: ICD-10-CM

## 2020-07-08 DIAGNOSIS — O30.033 MONOCHORIONIC DIAMNIOTIC TWIN GESTATION IN THIRD TRIMESTER: ICD-10-CM

## 2020-07-08 LAB
ALBUMIN SERPL BCP-MCNC: 2.7 G/DL (ref 3.5–5)
ALP SERPL-CCNC: 182 U/L (ref 46–116)
ALT SERPL W P-5'-P-CCNC: 17 U/L (ref 12–78)
ANION GAP SERPL CALCULATED.3IONS-SCNC: 12 MMOL/L (ref 4–13)
AST SERPL W P-5'-P-CCNC: 23 U/L (ref 5–45)
BACTERIA UR QL AUTO: ABNORMAL /HPF
BILIRUB SERPL-MCNC: 0.27 MG/DL (ref 0.2–1)
BILIRUB UR QL STRIP: NEGATIVE
BUN SERPL-MCNC: 8 MG/DL (ref 5–25)
CALCIUM SERPL-MCNC: 8.3 MG/DL (ref 8.3–10.1)
CHLORIDE SERPL-SCNC: 102 MMOL/L (ref 100–108)
CLARITY UR: CLEAR
CO2 SERPL-SCNC: 21 MMOL/L (ref 21–32)
COLOR UR: YELLOW
CREAT SERPL-MCNC: 0.6 MG/DL (ref 0.6–1.3)
CREAT UR-MCNC: 63 MG/DL
ERYTHROCYTE [DISTWIDTH] IN BLOOD BY AUTOMATED COUNT: 12.5 % (ref 11.6–15.1)
GFR SERPL CREATININE-BSD FRML MDRD: 128 ML/MIN/1.73SQ M
GLUCOSE SERPL-MCNC: 70 MG/DL (ref 65–140)
GLUCOSE UR STRIP-MCNC: NEGATIVE MG/DL
HCT VFR BLD AUTO: 34.7 % (ref 34.8–46.1)
HGB BLD-MCNC: 12.1 G/DL (ref 11.5–15.4)
HGB UR QL STRIP.AUTO: NEGATIVE
KETONES UR STRIP-MCNC: NEGATIVE MG/DL
LEUKOCYTE ESTERASE UR QL STRIP: NEGATIVE
MCH RBC QN AUTO: 32.9 PG (ref 26.8–34.3)
MCHC RBC AUTO-ENTMCNC: 34.9 G/DL (ref 31.4–37.4)
MCV RBC AUTO: 94 FL (ref 82–98)
NITRITE UR QL STRIP: NEGATIVE
NON-SQ EPI CELLS URNS QL MICRO: ABNORMAL /HPF
PH UR STRIP.AUTO: 6 [PH]
PLATELET # BLD AUTO: 146 THOUSANDS/UL (ref 149–390)
PMV BLD AUTO: 11.5 FL (ref 8.9–12.7)
POTASSIUM SERPL-SCNC: 3.9 MMOL/L (ref 3.5–5.3)
PROT SERPL-MCNC: 5.5 G/DL (ref 6.4–8.2)
PROT UR STRIP-MCNC: ABNORMAL MG/DL
PROT UR-MCNC: 72 MG/DL
PROT/CREAT UR: 1.14 MG/G{CREAT} (ref 0–0.1)
RBC # BLD AUTO: 3.68 MILLION/UL (ref 3.81–5.12)
RBC #/AREA URNS AUTO: ABNORMAL /HPF
SODIUM SERPL-SCNC: 135 MMOL/L (ref 136–145)
SP GR UR STRIP.AUTO: 1.02 (ref 1–1.03)
UROBILINOGEN UR QL STRIP.AUTO: 0.2 E.U./DL
WBC # BLD AUTO: 8.46 THOUSAND/UL (ref 4.31–10.16)
WBC #/AREA URNS AUTO: ABNORMAL /HPF

## 2020-07-08 PROCEDURE — 82570 ASSAY OF URINE CREATININE: CPT | Performed by: OBSTETRICS & GYNECOLOGY

## 2020-07-08 PROCEDURE — G0463 HOSPITAL OUTPT CLINIC VISIT: HCPCS

## 2020-07-08 PROCEDURE — 99024 POSTOP FOLLOW-UP VISIT: CPT | Performed by: OBSTETRICS & GYNECOLOGY

## 2020-07-08 PROCEDURE — 99215 OFFICE O/P EST HI 40 MIN: CPT

## 2020-07-08 PROCEDURE — PNV: Performed by: OBSTETRICS & GYNECOLOGY

## 2020-07-08 PROCEDURE — 80053 COMPREHEN METABOLIC PANEL: CPT | Performed by: OBSTETRICS & GYNECOLOGY

## 2020-07-08 PROCEDURE — 87086 URINE CULTURE/COLONY COUNT: CPT | Performed by: OBSTETRICS & GYNECOLOGY

## 2020-07-08 PROCEDURE — 4A1HXCZ MONITORING OF PRODUCTS OF CONCEPTION, CARDIAC RATE, EXTERNAL APPROACH: ICD-10-PCS | Performed by: OBSTETRICS & GYNECOLOGY

## 2020-07-08 PROCEDURE — 85027 COMPLETE CBC AUTOMATED: CPT | Performed by: OBSTETRICS & GYNECOLOGY

## 2020-07-08 PROCEDURE — 84156 ASSAY OF PROTEIN URINE: CPT | Performed by: OBSTETRICS & GYNECOLOGY

## 2020-07-08 PROCEDURE — 81001 URINALYSIS AUTO W/SCOPE: CPT | Performed by: OBSTETRICS & GYNECOLOGY

## 2020-07-08 RX ORDER — SODIUM CHLORIDE, SODIUM LACTATE, POTASSIUM CHLORIDE, CALCIUM CHLORIDE 600; 310; 30; 20 MG/100ML; MG/100ML; MG/100ML; MG/100ML
125 INJECTION, SOLUTION INTRAVENOUS CONTINUOUS
Status: DISCONTINUED | OUTPATIENT
Start: 2020-07-08 | End: 2020-07-12 | Stop reason: HOSPADM

## 2020-07-08 RX ORDER — SODIUM CHLORIDE, SODIUM LACTATE, POTASSIUM CHLORIDE, CALCIUM CHLORIDE 600; 310; 30; 20 MG/100ML; MG/100ML; MG/100ML; MG/100ML
75 INJECTION, SOLUTION INTRAVENOUS CONTINUOUS
Status: DISCONTINUED | OUTPATIENT
Start: 2020-07-08 | End: 2020-07-08

## 2020-07-08 RX ORDER — BETAMETHASONE SODIUM PHOSPHATE AND BETAMETHASONE ACETATE 3; 3 MG/ML; MG/ML
12 INJECTION, SUSPENSION INTRA-ARTICULAR; INTRALESIONAL; INTRAMUSCULAR; SOFT TISSUE EVERY 24 HOURS
Status: DISCONTINUED | OUTPATIENT
Start: 2020-07-08 | End: 2020-07-09

## 2020-07-08 RX ORDER — ESCITALOPRAM OXALATE 10 MG/1
TABLET ORAL
Qty: 90 TABLET | Refills: 0 | Status: SHIPPED | OUTPATIENT
Start: 2020-07-08 | End: 2020-08-06 | Stop reason: ALTCHOICE

## 2020-07-08 RX ADMIN — SODIUM CHLORIDE, SODIUM LACTATE, POTASSIUM CHLORIDE, AND CALCIUM CHLORIDE 75 ML/HR: .6; .31; .03; .02 INJECTION, SOLUTION INTRAVENOUS at 19:57

## 2020-07-08 RX ADMIN — SODIUM CHLORIDE, SODIUM LACTATE, POTASSIUM CHLORIDE, AND CALCIUM CHLORIDE 75 ML/HR: .6; .31; .03; .02 INJECTION, SOLUTION INTRAVENOUS at 19:55

## 2020-07-08 RX ADMIN — BETAMETHASONE SODIUM PHOSPHATE AND BETAMETHASONE ACETATE 12 MG: 3; 3 INJECTION, SUSPENSION INTRA-ARTICULAR; INTRALESIONAL; INTRAMUSCULAR at 18:22

## 2020-07-08 NOTE — PROGRESS NOTES
Visit:  Good FM - some cramping but resolves with rest - edema does not go away but improves slightly -  / 137

## 2020-07-08 NOTE — PROGRESS NOTES
BP still elevated 142 / 88 - per [facility] has diagnosis of GHTN and for delivery at 34 weeks - will send to L and D for r/o preeclampsia - if ok will schedule IOL at 34 weeks-  Reviewed risks of vaginal / c/s  - patient would like to try for vaginal - aware of weight discrepency for twin B -

## 2020-07-08 NOTE — H&P
[facility] 26 y o  female MRN: [id number]  Unit/Bed#: LD TRIAGE  Encounter: [id number]      Assessment: Lin Morrell is a 26 y o  Latoya Sheerer mono/di twin pregnancy at 33w2d who presents to triage for elevated BP, r/o pre eclampsia  Has current gestational hypertension diagnosis, preeclampsia labs in the office 1 week ago were within normal limits  Today protein creatinine ratio is 1 14, therefore she now meets criteria for preeclampsia without severe features  Creatinine 0 6  Pressures have been 130s-140s/60s-70s,  High of 152/69  She denies headaches, dizziness, RUQ pain  Last  ultrasound   Her twin girls were vertex/vertex at last scan, twin B slightly bigger than twin A with 22% discordance  SVE:   FHT:   - Twin A: 135  - Twin B: 125    Plan:   · Admit  · CBC, RPR, Type & Screen  · 24 hour blood pressure monitoring  · Continuous fetal monitoring  · Repeat urine protein creatinine ratio and labs in 24 hours per [facility]   · Betamethasone ordered  · Urine culture pending    Dr Paola Mcnally aware      HPI:     26 y o  Latoya Sheerer 33w2d mono/di twin pregnancy presents with complaint of elevated blood pressures, r/o pre-eclampsia  She has a diagnosis of gestational hypertension, sent to triage by Dr Yeh to r/o preE  Denies headache, blurry vision, heart palpitations, right upper quadrant abdominal pain, shortness of breath  States she has mild cramping, but attributed to fetal movement  Estimated due date 2020 by last menstrual period  She is being admitted for preeclampsia without severe features  She complains of contractions, no loss of fluid, reports good fetal movement  Contractions: yes, q2-3 but she does not feel them  Leakage of fluid: no  Vaginal Bleeding: no  Fetal movement: present    Her current obstetrical history is significant for twin pregnancy, monochorionic diamniotic    Gestational hypertension, velamentous insertion of umbilical cord   22% discordance between twin a and twin B, twin B larger        Patient Active Problem List   Diagnosis    Depression    Abnormal cardiac valve    Gestational hypertension    Velamentous insertion of umbilical cord in second trimester    Encounter for ultrasound to check fetal growth    33 weeks gestation of pregnancy    Monochorionic diamniotic twin gestation       Baby complications/comments:  velamentous insertion of umbilical cord  22% discordance between twin a and twin B, twin B larger      Review of Systems   Constitutional: Negative  HENT: Negative  Respiratory: Negative  Negative for apnea and chest tightness  Cardiovascular: Positive for leg swelling  Negative for chest pain and palpitations  Gastrointestinal: Negative  Genitourinary: Negative for difficulty urinating  Musculoskeletal: Negative for arthralgias  Neurological: Negative for dizziness  Hematological: Negative for adenopathy  Psychiatric/Behavioral: Negative  Negative for agitation         OB History    Para Term  AB Living   2 1 1 0 0 1   SAB TAB Ectopic Multiple Live Births   0 0 0 0 1      # Outcome Date GA Lbr Magdy/2nd Weight Sex Delivery Anes PTL Lv   2 Current            1 Term 17 37w0d / 00:13 3360 g (7 lb 6 5 oz) F Vag-Spont EPI N ARNIE       Past Medical History:   Diagnosis Date    Depression 2017    POTS (postural orthostatic tachycardia syndrome)     Varicella        Past Surgical History:   Procedure Laterality Date    PALATE SURGERY         Social History     Tobacco Use    Smoking status: Never Smoker    Smokeless tobacco: Never Used   Substance Use Topics    Alcohol use: No       Allergies   Allergen Reactions    Amoxicillin Anaphylaxis and Hives     As a child and was admitted to hosp because she "almost "       Medications Prior to Admission   Medication    aspirin (ECOTRIN LOW STRENGTH) 81 mg EC tablet    Ferrous Sulfate (IRON PO)    FOLIC ACID PO    Prenatal Vit-Fe Fumarate-FA (PRENATAL FA PO)    Blood Pressure Monitoring (BLOOD PRESSURE CUFF) MISC    escitalopram (LEXAPRO) 10 mg tablet           OBJECTIVE:  Vitals:  Temp:  [97 5 °F (36 4 °C)-99 °F (37 2 °C)] 99 °F (37 2 °C)  HR:  [] 78  Resp:  [20] 20  BP: (125-152)/(61-96) 137/62  Body mass index is 31 53 kg/m²  Physical Exam:  Physical Exam   Constitutional: She is oriented to person, place, and time  She appears well-developed and well-nourished  Cardiovascular: Normal rate, regular rhythm and normal heart sounds  Pulmonary/Chest: Effort normal and breath sounds normal    Abdominal: Soft  She exhibits no distension  There is no tenderness  Musculoskeletal: She exhibits edema  2+ edema lower extremities   Neurological: She is alert and oriented to person, place, and time  Skin: Skin is warm  Psychiatric: She has a normal mood and affect   Her behavior is normal  Judgment and thought content normal         SVE:  Dilation: 1-2  Effacement (%): 70  Station: -1  Cervical Characteristics: Posterior, Soft    FHT:  Baseline Rate: 135 bpm  Variability: Moderate 6-25 bpm  Accelerations: 15 x 15 or greater  FHR Category: Category I    TOCO:   Contraction Frequency (minutes): 1 5-3  Contraction Duration (seconds): 60  Contraction Quality: Mild    Lab Results   Component Value Date    WBC 8 46 07/08/2020    HGB 12 1 07/08/2020    HCT 34 7 (L) 07/08/2020     (L) 07/08/2020     Lab Results   Component Value Date     02/15/2016    K 3 9 07/08/2020     07/08/2020    CO2 21 07/08/2020    BUN 8 07/08/2020    CREATININE 0 60 07/08/2020    GLUCOSE 94 02/15/2016    AST 23 07/08/2020    ALT 17 07/08/2020       Prenatal Labs: Reviewed      Blood type: O+  Antibody: negative  Group B strep: unknown  HIV: negative  Hepatitis B: negative  RPR: non-reactive  Rubella: Immune  Varicella: Immune  1 hour Glucose: 122  Platelets: 573  Hgb: 12 1    <2 Midnights  OBSERVATION      Gerald Mariscal DO  OB/GYN PGY-1  7/8/2020  5:54 PM

## 2020-07-09 ENCOUNTER — ANESTHESIA EVENT (INPATIENT)
Dept: LABOR AND DELIVERY | Facility: HOSPITAL | Age: 25
End: 2020-07-09
Payer: COMMERCIAL

## 2020-07-09 ENCOUNTER — ANESTHESIA (INPATIENT)
Dept: LABOR AND DELIVERY | Facility: HOSPITAL | Age: 25
End: 2020-07-09
Payer: COMMERCIAL

## 2020-07-09 PROBLEM — Z98.891 S/P CESAREAN SECTION: Status: ACTIVE | Noted: 2020-07-01

## 2020-07-09 LAB
ABO GROUP BLD: NORMAL
ALBUMIN SERPL BCP-MCNC: 2.6 G/DL (ref 3.5–5)
ALP SERPL-CCNC: 188 U/L (ref 46–116)
ALT SERPL W P-5'-P-CCNC: 18 U/L (ref 12–78)
ANION GAP SERPL CALCULATED.3IONS-SCNC: 11 MMOL/L (ref 4–13)
AST SERPL W P-5'-P-CCNC: 25 U/L (ref 5–45)
BACTERIA UR CULT: NORMAL
BASE EXCESS BLDCOA CALC-SCNC: -3.3 MMOL/L (ref 3–11)
BASE EXCESS BLDCOA CALC-SCNC: -7 MMOL/L (ref 3–11)
BASE EXCESS BLDCOV CALC-SCNC: -3.3 MMOL/L (ref 1–9)
BASE EXCESS BLDCOV CALC-SCNC: -3.9 MMOL/L (ref 1–9)
BASOPHILS # BLD MANUAL: 0 THOUSAND/UL (ref 0–0.1)
BASOPHILS NFR MAR MANUAL: 0 % (ref 0–1)
BILIRUB SERPL-MCNC: 0.29 MG/DL (ref 0.2–1)
BLD GP AB SCN SERPL QL: NEGATIVE
BUN SERPL-MCNC: 12 MG/DL (ref 5–25)
CALCIUM SERPL-MCNC: 7.9 MG/DL (ref 8.3–10.1)
CHLORIDE SERPL-SCNC: 99 MMOL/L (ref 100–108)
CO2 SERPL-SCNC: 21 MMOL/L (ref 21–32)
CREAT SERPL-MCNC: 0.72 MG/DL (ref 0.6–1.3)
CREAT UR-MCNC: 364 MG/DL
EOSINOPHIL # BLD MANUAL: 0 THOUSAND/UL (ref 0–0.4)
EOSINOPHIL NFR BLD MANUAL: 0 % (ref 0–6)
ERYTHROCYTE [DISTWIDTH] IN BLOOD BY AUTOMATED COUNT: 12.7 % (ref 11.6–15.1)
GFR SERPL CREATININE-BSD FRML MDRD: 118 ML/MIN/1.73SQ M
GLUCOSE SERPL-MCNC: 105 MG/DL (ref 65–140)
HCO3 BLDCOA-SCNC: 18.7 MMOL/L (ref 17.3–27.3)
HCO3 BLDCOA-SCNC: 22.8 MMOL/L (ref 17.3–27.3)
HCO3 BLDCOV-SCNC: 21.6 MMOL/L (ref 12.2–28.6)
HCO3 BLDCOV-SCNC: 22.1 MMOL/L (ref 12.2–28.6)
HCT VFR BLD AUTO: 35.1 % (ref 34.8–46.1)
HGB BLD-MCNC: 12 G/DL (ref 11.5–15.4)
LYMPHOCYTES # BLD AUTO: 1.23 THOUSAND/UL (ref 0.6–4.47)
LYMPHOCYTES # BLD AUTO: 9 % (ref 14–44)
MCH RBC QN AUTO: 32.6 PG (ref 26.8–34.3)
MCHC RBC AUTO-ENTMCNC: 34.2 G/DL (ref 31.4–37.4)
MCV RBC AUTO: 95 FL (ref 82–98)
MONOCYTES # BLD AUTO: 0.82 THOUSAND/UL (ref 0–1.22)
MONOCYTES NFR BLD: 6 % (ref 4–12)
NEUTROPHILS # BLD MANUAL: 11.61 THOUSAND/UL (ref 1.85–7.62)
NEUTS BAND NFR BLD MANUAL: 1 % (ref 0–8)
NEUTS SEG NFR BLD AUTO: 84 % (ref 43–75)
NRBC BLD AUTO-RTO: 0 /100 WBCS
O2 CT VFR BLDCOA CALC: 8.9 ML/DL
O2 CT VFR BLDCOA CALC: 9.2 ML/DL
OXYHGB MFR BLDCOA: 39.5 %
OXYHGB MFR BLDCOA: 40 %
OXYHGB MFR BLDCOV: 52.4 %
OXYHGB MFR BLDCOV: 56.2 %
PCO2 BLDCOA: 38.3 MM[HG] (ref 30–60)
PCO2 BLDCOA: 44.4 MM[HG] (ref 30–60)
PCO2 BLDCOV: 41.1 MM HG (ref 27–43)
PCO2 BLDCOV: 41.1 MM HG (ref 27–43)
PH BLDCOA: 7.31 [PH] (ref 7.23–7.43)
PH BLDCOA: 7.33 [PH] (ref 7.23–7.43)
PH BLDCOV: 7.34 [PH] (ref 7.19–7.49)
PH BLDCOV: 7.35 [PH] (ref 7.19–7.49)
PLATELET # BLD AUTO: 164 THOUSANDS/UL (ref 149–390)
PLATELET BLD QL SMEAR: ADEQUATE
PMV BLD AUTO: 11.3 FL (ref 8.9–12.7)
PO2 BLDCOA: 17.7 MM HG (ref 5–25)
PO2 BLDCOA: 18.3 MM HG (ref 5–25)
PO2 BLDCOV: 21.5 MM HG (ref 15–45)
PO2 BLDCOV: 22.9 MM HG (ref 15–45)
POTASSIUM SERPL-SCNC: 4.2 MMOL/L (ref 3.5–5.3)
PROT SERPL-MCNC: 5.7 G/DL (ref 6.4–8.2)
PROT UR-MCNC: 171 MG/DL
PROT/CREAT UR: 0.47 MG/G{CREAT} (ref 0–0.1)
RBC # BLD AUTO: 3.68 MILLION/UL (ref 3.81–5.12)
RBC MORPH BLD: NORMAL
RH BLD: POSITIVE
SAO2 % BLDCOV: 11.9 ML/DL
SAO2 % BLDCOV: 13 ML/DL
SODIUM SERPL-SCNC: 131 MMOL/L (ref 136–145)
SPECIMEN EXPIRATION DATE: NORMAL
TOTAL CELLS COUNTED SPEC: 100
WBC # BLD AUTO: 13.66 THOUSAND/UL (ref 4.31–10.16)

## 2020-07-09 PROCEDURE — 86901 BLOOD TYPING SEROLOGIC RH(D): CPT | Performed by: OBSTETRICS & GYNECOLOGY

## 2020-07-09 PROCEDURE — 59510 CESAREAN DELIVERY: CPT | Performed by: OBSTETRICS & GYNECOLOGY

## 2020-07-09 PROCEDURE — 82570 ASSAY OF URINE CREATININE: CPT | Performed by: OBSTETRICS & GYNECOLOGY

## 2020-07-09 PROCEDURE — 85007 BL SMEAR W/DIFF WBC COUNT: CPT | Performed by: OBSTETRICS & GYNECOLOGY

## 2020-07-09 PROCEDURE — 82805 BLOOD GASES W/O2 SATURATION: CPT | Performed by: OBSTETRICS & GYNECOLOGY

## 2020-07-09 PROCEDURE — 87653 STREP B DNA AMP PROBE: CPT | Performed by: OBSTETRICS & GYNECOLOGY

## 2020-07-09 PROCEDURE — 84156 ASSAY OF PROTEIN URINE: CPT | Performed by: OBSTETRICS & GYNECOLOGY

## 2020-07-09 PROCEDURE — 86850 RBC ANTIBODY SCREEN: CPT | Performed by: OBSTETRICS & GYNECOLOGY

## 2020-07-09 PROCEDURE — 85027 COMPLETE CBC AUTOMATED: CPT | Performed by: OBSTETRICS & GYNECOLOGY

## 2020-07-09 PROCEDURE — 88307 TISSUE EXAM BY PATHOLOGIST: CPT | Performed by: PATHOLOGY

## 2020-07-09 PROCEDURE — 80053 COMPREHEN METABOLIC PANEL: CPT | Performed by: OBSTETRICS & GYNECOLOGY

## 2020-07-09 PROCEDURE — 86900 BLOOD TYPING SEROLOGIC ABO: CPT | Performed by: OBSTETRICS & GYNECOLOGY

## 2020-07-09 PROCEDURE — 99024 POSTOP FOLLOW-UP VISIT: CPT | Performed by: OBSTETRICS & GYNECOLOGY

## 2020-07-09 RX ORDER — ONDANSETRON 2 MG/ML
4 INJECTION INTRAMUSCULAR; INTRAVENOUS EVERY 8 HOURS PRN
Status: DISCONTINUED | OUTPATIENT
Start: 2020-07-10 | End: 2020-07-12 | Stop reason: HOSPADM

## 2020-07-09 RX ORDER — MORPHINE SULFATE 0.5 MG/ML
INJECTION, SOLUTION EPIDURAL; INTRATHECAL; INTRAVENOUS AS NEEDED
Status: DISCONTINUED | OUTPATIENT
Start: 2020-07-09 | End: 2020-07-09 | Stop reason: SURG

## 2020-07-09 RX ORDER — DEXAMETHASONE SODIUM PHOSPHATE 10 MG/ML
8 INJECTION, SOLUTION INTRAMUSCULAR; INTRAVENOUS ONCE AS NEEDED
Status: ACTIVE | OUTPATIENT
Start: 2020-07-09 | End: 2020-07-10

## 2020-07-09 RX ORDER — ONDANSETRON 2 MG/ML
4 INJECTION INTRAMUSCULAR; INTRAVENOUS ONCE AS NEEDED
Status: DISCONTINUED | OUTPATIENT
Start: 2020-07-09 | End: 2020-07-10

## 2020-07-09 RX ORDER — DOCUSATE SODIUM 100 MG/1
100 CAPSULE, LIQUID FILLED ORAL 2 TIMES DAILY
Status: DISCONTINUED | OUTPATIENT
Start: 2020-07-09 | End: 2020-07-12 | Stop reason: HOSPADM

## 2020-07-09 RX ORDER — METOCLOPRAMIDE HYDROCHLORIDE 5 MG/ML
10 INJECTION INTRAMUSCULAR; INTRAVENOUS ONCE AS NEEDED
Status: DISCONTINUED | OUTPATIENT
Start: 2020-07-09 | End: 2020-07-10

## 2020-07-09 RX ORDER — HYDROMORPHONE HCL/PF 1 MG/ML
0.5 SYRINGE (ML) INJECTION EVERY 2 HOUR PRN
Status: DISCONTINUED | OUTPATIENT
Start: 2020-07-09 | End: 2020-07-12 | Stop reason: HOSPADM

## 2020-07-09 RX ORDER — BUTORPHANOL TARTRATE 1 MG/ML
1 INJECTION, SOLUTION INTRAMUSCULAR; INTRAVENOUS ONCE
Status: COMPLETED | OUTPATIENT
Start: 2020-07-09 | End: 2020-07-09

## 2020-07-09 RX ORDER — OXYTOCIN/RINGER'S LACTATE 30/500 ML
PLASTIC BAG, INJECTION (ML) INTRAVENOUS CONTINUOUS PRN
Status: DISCONTINUED | OUTPATIENT
Start: 2020-07-09 | End: 2020-07-09 | Stop reason: SURG

## 2020-07-09 RX ORDER — BUPIVACAINE HYDROCHLORIDE 7.5 MG/ML
INJECTION, SOLUTION INTRASPINAL AS NEEDED
Status: DISCONTINUED | OUTPATIENT
Start: 2020-07-09 | End: 2020-07-09 | Stop reason: SURG

## 2020-07-09 RX ORDER — ONDANSETRON 2 MG/ML
4 INJECTION INTRAMUSCULAR; INTRAVENOUS EVERY 4 HOURS PRN
Status: ACTIVE | OUTPATIENT
Start: 2020-07-09 | End: 2020-07-10

## 2020-07-09 RX ORDER — NALOXONE HYDROCHLORIDE 0.4 MG/ML
0.1 INJECTION, SOLUTION INTRAMUSCULAR; INTRAVENOUS; SUBCUTANEOUS
Status: ACTIVE | OUTPATIENT
Start: 2020-07-09 | End: 2020-07-10

## 2020-07-09 RX ORDER — ACETAMINOPHEN 325 MG/1
650 TABLET ORAL EVERY 4 HOURS PRN
Status: DISCONTINUED | OUTPATIENT
Start: 2020-07-09 | End: 2020-07-12 | Stop reason: HOSPADM

## 2020-07-09 RX ORDER — CALCIUM CARBONATE 200(500)MG
1000 TABLET,CHEWABLE ORAL DAILY PRN
Status: DISCONTINUED | OUTPATIENT
Start: 2020-07-09 | End: 2020-07-12 | Stop reason: HOSPADM

## 2020-07-09 RX ORDER — HYDROMORPHONE HCL/PF 1 MG/ML
1 SYRINGE (ML) INJECTION EVERY 2 HOUR PRN
Status: DISCONTINUED | OUTPATIENT
Start: 2020-07-10 | End: 2020-07-12 | Stop reason: HOSPADM

## 2020-07-09 RX ORDER — IBUPROFEN 600 MG/1
600 TABLET ORAL EVERY 6 HOURS PRN
Status: DISCONTINUED | OUTPATIENT
Start: 2020-07-10 | End: 2020-07-12 | Stop reason: HOSPADM

## 2020-07-09 RX ORDER — FENTANYL CITRATE/PF 50 MCG/ML
25 SYRINGE (ML) INJECTION
Status: DISCONTINUED | OUTPATIENT
Start: 2020-07-09 | End: 2020-07-10

## 2020-07-09 RX ORDER — KETOROLAC TROMETHAMINE 30 MG/ML
30 INJECTION, SOLUTION INTRAMUSCULAR; INTRAVENOUS EVERY 6 HOURS
Status: DISPENSED | OUTPATIENT
Start: 2020-07-09 | End: 2020-07-10

## 2020-07-09 RX ORDER — METOCLOPRAMIDE HYDROCHLORIDE 5 MG/ML
5 INJECTION INTRAMUSCULAR; INTRAVENOUS EVERY 6 HOURS PRN
Status: ACTIVE | OUTPATIENT
Start: 2020-07-09 | End: 2020-07-10

## 2020-07-09 RX ORDER — BETAMETHASONE SODIUM PHOSPHATE AND BETAMETHASONE ACETATE 3; 3 MG/ML; MG/ML
12 INJECTION, SUSPENSION INTRA-ARTICULAR; INTRALESIONAL; INTRAMUSCULAR; SOFT TISSUE EVERY 24 HOURS
Status: COMPLETED | OUTPATIENT
Start: 2020-07-09 | End: 2020-07-09

## 2020-07-09 RX ORDER — DIPHENHYDRAMINE HYDROCHLORIDE 50 MG/ML
25 INJECTION INTRAMUSCULAR; INTRAVENOUS EVERY 6 HOURS PRN
Status: DISCONTINUED | OUTPATIENT
Start: 2020-07-09 | End: 2020-07-09

## 2020-07-09 RX ORDER — DIAPER,BRIEF,INFANT-TODD,DISP
1 EACH MISCELLANEOUS DAILY PRN
Status: DISCONTINUED | OUTPATIENT
Start: 2020-07-09 | End: 2020-07-12 | Stop reason: HOSPADM

## 2020-07-09 RX ORDER — OXYCODONE HYDROCHLORIDE 10 MG/1
10 TABLET ORAL EVERY 4 HOURS PRN
Status: DISCONTINUED | OUTPATIENT
Start: 2020-07-10 | End: 2020-07-12 | Stop reason: HOSPADM

## 2020-07-09 RX ORDER — DIPHENHYDRAMINE HCL 25 MG
25 TABLET ORAL EVERY 6 HOURS PRN
Status: DISCONTINUED | OUTPATIENT
Start: 2020-07-09 | End: 2020-07-12 | Stop reason: HOSPADM

## 2020-07-09 RX ORDER — HYDROMORPHONE HCL/PF 1 MG/ML
0.5 SYRINGE (ML) INJECTION
Status: DISCONTINUED | OUTPATIENT
Start: 2020-07-09 | End: 2020-07-10

## 2020-07-09 RX ORDER — CLINDAMYCIN PHOSPHATE 900 MG/50ML
900 INJECTION INTRAVENOUS ONCE
Status: COMPLETED | OUTPATIENT
Start: 2020-07-09 | End: 2020-07-09

## 2020-07-09 RX ORDER — OXYTOCIN/RINGER'S LACTATE 30/500 ML
62.5 PLASTIC BAG, INJECTION (ML) INTRAVENOUS CONTINUOUS
Status: DISPENSED | OUTPATIENT
Start: 2020-07-09 | End: 2020-07-10

## 2020-07-09 RX ORDER — PROMETHAZINE HYDROCHLORIDE 25 MG/ML
12.5 INJECTION, SOLUTION INTRAMUSCULAR; INTRAVENOUS ONCE
Status: COMPLETED | OUTPATIENT
Start: 2020-07-09 | End: 2020-07-09

## 2020-07-09 RX ORDER — OXYCODONE HYDROCHLORIDE 5 MG/1
5 TABLET ORAL EVERY 4 HOURS PRN
Status: DISCONTINUED | OUTPATIENT
Start: 2020-07-10 | End: 2020-07-12 | Stop reason: HOSPADM

## 2020-07-09 RX ORDER — KETOROLAC TROMETHAMINE 30 MG/ML
INJECTION, SOLUTION INTRAMUSCULAR; INTRAVENOUS AS NEEDED
Status: DISCONTINUED | OUTPATIENT
Start: 2020-07-09 | End: 2020-07-09 | Stop reason: SURG

## 2020-07-09 RX ADMIN — SODIUM CHLORIDE, SODIUM LACTATE, POTASSIUM CHLORIDE, AND CALCIUM CHLORIDE 125 ML/HR: .6; .31; .03; .02 INJECTION, SOLUTION INTRAVENOUS at 14:47

## 2020-07-09 RX ADMIN — MORPHINE SULFATE 0.2 MG: 0.5 INJECTION, SOLUTION EPIDURAL; INTRATHECAL; INTRAVENOUS at 19:13

## 2020-07-09 RX ADMIN — SODIUM CHLORIDE, SODIUM LACTATE, POTASSIUM CHLORIDE, AND CALCIUM CHLORIDE 125 ML/HR: .6; .31; .03; .02 INJECTION, SOLUTION INTRAVENOUS at 23:08

## 2020-07-09 RX ADMIN — SODIUM CHLORIDE, SODIUM LACTATE, POTASSIUM CHLORIDE, AND CALCIUM CHLORIDE: .6; .31; .03; .02 INJECTION, SOLUTION INTRAVENOUS at 19:41

## 2020-07-09 RX ADMIN — BETAMETHASONE SODIUM PHOSPHATE AND BETAMETHASONE ACETATE 12 MG: 3; 3 INJECTION, SUSPENSION INTRA-ARTICULAR; INTRALESIONAL; INTRAMUSCULAR; SOFT TISSUE at 10:10

## 2020-07-09 RX ADMIN — Medication 62.5 MILLI-UNITS/MIN: at 21:34

## 2020-07-09 RX ADMIN — GENTAMICIN SULFATE 260 MG: 40 INJECTION, SOLUTION INTRAMUSCULAR; INTRAVENOUS at 19:17

## 2020-07-09 RX ADMIN — VANCOMYCIN HYDROCHLORIDE 1500 MG: 5 INJECTION, POWDER, LYOPHILIZED, FOR SOLUTION INTRAVENOUS at 10:23

## 2020-07-09 RX ADMIN — Medication 250 MILLI-UNITS/MIN: at 19:34

## 2020-07-09 RX ADMIN — BUTORPHANOL TARTRATE 1 MG: 1 INJECTION, SOLUTION INTRAMUSCULAR; INTRAVENOUS at 15:29

## 2020-07-09 RX ADMIN — CLINDAMYCIN PHOSPHATE 900 MG: 18 INJECTION, SOLUTION INTRAMUSCULAR; INTRAVENOUS at 19:12

## 2020-07-09 RX ADMIN — BUTORPHANOL TARTRATE 1 MG: 1 INJECTION, SOLUTION INTRAMUSCULAR; INTRAVENOUS at 01:46

## 2020-07-09 RX ADMIN — MORPHINE SULFATE 2.8 MG: 0.5 INJECTION, SOLUTION EPIDURAL; INTRATHECAL; INTRAVENOUS at 20:00

## 2020-07-09 RX ADMIN — PHENYLEPHRINE HYDROCHLORIDE 50 MCG/MIN: 10 INJECTION INTRAVENOUS at 19:13

## 2020-07-09 RX ADMIN — MORPHINE SULFATE 2 MG: 0.5 INJECTION, SOLUTION EPIDURAL; INTRATHECAL; INTRAVENOUS at 19:45

## 2020-07-09 RX ADMIN — PHENYLEPHRINE HYDROCHLORIDE 100 MCG: 10 INJECTION INTRAVENOUS at 19:13

## 2020-07-09 RX ADMIN — PROMETHAZINE HYDROCHLORIDE 12.5 MG: 25 INJECTION INTRAMUSCULAR; INTRAVENOUS at 01:46

## 2020-07-09 RX ADMIN — BUPIVACAINE HYDROCHLORIDE IN DEXTROSE 1.4 ML: 7.5 INJECTION, SOLUTION SUBARACHNOID at 19:13

## 2020-07-09 RX ADMIN — SODIUM CHLORIDE, SODIUM LACTATE, POTASSIUM CHLORIDE, AND CALCIUM CHLORIDE 75 ML/HR: .6; .31; .03; .02 INJECTION, SOLUTION INTRAVENOUS at 01:07

## 2020-07-09 RX ADMIN — KETOROLAC TROMETHAMINE 30 MG: 30 INJECTION, SOLUTION INTRAMUSCULAR at 19:55

## 2020-07-09 NOTE — QUICK NOTE
Now requesting pain medication for contractions  Giving stadol 1 mg IV and phenergan 12 5 mg IV x 1  Will continue to monitor closely       [place], DO

## 2020-07-09 NOTE — QUICK NOTE
Patient reporting contractions more moderate and noticeable than they had been earlier this evening  Denies associated vaginal bleeding or leakage of fluid      Vitals:    07/09/20 0026   BP: 137/78   Pulse: 72   Resp: 16   Temp: 98 4 °F (36 9 °C)   SpO2:      Cervix: 2/70/-1, essentially unchanged since prior exam  FHT: Baby A: 120/moderate variability/+ accelerations/no decelerations  Baby B: 130/moderate variability/+ accelerations/no decelerations  Mina: q2 minutes    Will place on continuous external fetal monitoring overnight  To alert nurse if contractions become more frequent    Tali Catie, DO

## 2020-07-09 NOTE — UTILIZATION REVIEW
Initial Clinical Review    Admission: Date/Time/Statement: Admission Orders (From admission, onward)     Ordered        07/08/20 1731  Place in Observation  Once                   Orders Placed This Encounter   Procedures    Place in Observation     Standing Status:   Standing     Number of Occurrences:   1     Order Specific Question:   Admitting Physician     Answer:   Christian Morgan [[id number]]     Order Specific Question:   Level of Care     Answer:   Med Surg [16]     Chief Complaint   Patient presents with    Hypertension     Assessment/Plan:  25 yo  G 2 P 1 @ 33 2/7 wks gestation with mono/di twins presented to [facility] triage with increased BP's with discordance growth of twin B 22%  Velamentous insertion of umbilical cord notes  Patient (+) 2 LE edema  Plan continuous external monitoring BTM  Patient (+) contractions mild-moderate SVE 07-09-20 ( 3/70/-1 )    SVE:  Dilation: 1-2  Effacement (%): 70  Station: -1  Cervical Characteristics: Posterior, Soft     FHT:  Baseline Rate: 135 bpm  Variability: Moderate 6-25 bpm  Accelerations: 15 x 15 or greater  FHR Category: Category I     TOCO:   Contraction Frequency (minutes): 1 5-3  Contraction Duration (seconds): 60  Contraction Quality: Mild     OB  Triage Vitals   Temperature Pulse Respirations Blood Pressure SpO2   07/08/20 1555 07/08/20 1550 07/08/20 1555 07/08/20 1550 07/08/20 2043   99 °F (37 2 °C) 84 20 138/77 98 %      Temp Source Heart Rate Source Patient Position - Orthostatic VS BP Location FiO2 (%)   07/08/20 1555 07/08/20 1550 07/08/20 2043 07/08/20 1550 --   Oral Monitor Sitting Right arm       Pain Score       07/08/20 1559       No Pain        Wt Readings from Last 1 Encounters:   07/08/20 80 7 kg (178 lb)     Additional Vital Signs:   07/09/20 0435  98 2 °F (36 8 °C)  83  18  139/72  --  --  --   07/09/20 0126  --  --  --  --  --  --  --   Comment rows:   OBSERV: Dr Grayson Alvarez at patient bedside   at 07/09/20 0126   07/09/20 0102  --  --  --  --  --  --  -- Comment rows:   OBSERV: Dr Rahman Members at patient bedside  at 07/09/20 0102   07/09/20 0026  98 4 °F (36 9 °C)  72  16  137/78  --  --  --   07/09/20 0011  --  --  --  --  --  --  --   Comment rows:   OBSERV: RN spoke with Dr Rahman Members about contraction pattern, patient has no c/o contraction discomfort, patient visibly comfortable and in no apparent distress, MD to RN okay to disconfinue fetal monitoring   at 07/09/20 0011 07/08/20 2043  99 2 °F (37 3 °C)  73  16  135/64  98 %  --  Sitting   07/08/20 2015  --  --  --  --  --  WDL  --   07/08/20 1942  --  88  --  136/63  --  --  --   07/08/20 1927  --  89  --  139/68  --  --  --   07/08/20 1912  --  81  --  135/66  --  --  --   07/08/20 1900  --  85  --  139/68  --  --  --   07/08/20 1842  --  85  --  135/63  --  --  --   07/08/20 1828  --  71  --  143/66  --  --  --   07/08/20 1815  --  81  --  136/62  --  --  --   07/08/20 1800  --  88  --  135/66  --  --  --   07/08/20 1743  --  82  --  134/60  --  --  --   07/08/20 1728  --  78  --  137/62  --  --  --   07/08/20 1713  --  73  --  136/61  --  --  --   07/08/20 1658  --  80  --  132/72  --  --  --   07/08/20 1642  --  101  --  146/72  --  --  --   07/08/20 1627  --  86  --  135/66  --  --  --   07/08/20 1605  --  74  --  125/64             Pertinent Labs/Diagnostic Test Results:       Results from last 7 days   Lab Units 07/08/20  1621   WBC Thousand/uL 8 46   HEMOGLOBIN g/dL 12 1   HEMATOCRIT % 34 7*   PLATELETS Thousands/uL 146*         Results from last 7 days   Lab Units 07/08/20  1621   SODIUM mmol/L 135*   POTASSIUM mmol/L 3 9   CHLORIDE mmol/L 102   CO2 mmol/L 21   ANION GAP mmol/L 12   BUN mg/dL 8   CREATININE mg/dL 0 60   EGFR ml/min/1 73sq m 128   CALCIUM mg/dL 8 3     Results from last 7 days   Lab Units 07/08/20  1621   AST U/L 23   ALT U/L 17   ALK PHOS U/L 182*   TOTAL PROTEIN g/dL 5 5*   ALBUMIN g/dL 2 7*   TOTAL BILIRUBIN mg/dL 0 27         Results from last 7 days   Lab Units 07/08/20  1621   GLUCOSE RANDOM mg/dL 70     Results from last 7 days   Lab Units 07/08/20  1622   CLARITY UA  Clear   COLOR UA  Yellow   SPEC GRAV UA  1 020   PH UA  6 0   GLUCOSE UA mg/dl Negative   KETONES UA mg/dl Negative   BLOOD UA  Negative   PROTEIN UA mg/dl 30 (1+)*   NITRITE UA  Negative   BILIRUBIN UA  Negative   UROBILINOGEN UA E U /dl 0 2   LEUKOCYTES UA  Negative   WBC UA /hpf 0-1*   RBC UA /hpf 0-1*   BACTERIA UA /hpf Occasional   EPITHELIAL CELLS WET PREP /hpf Occasional   CREATININE UR mg/dL 63 0   PROTEIN UR mg/dL 72   PROT/CREAT RATIO UR  1 14*         Past Medical History:   Diagnosis Date    Carpal tunnel syndrome     with pregnancy    Depression 11/2/2017    POTS (postural orthostatic tachycardia syndrome)     Varicella      Present on Admission:  Izzy Ann Monochorionic diamniotic twin gestation      Admitting Diagnosis: 33 weeks gestation of pregnancy [Z3A 33]  Age/Sex: 26 y o  female  Admission Orders:  Scheduled Medications:    Medications:  betamethasone acetate-betamethasone sodium phosphate 12 mg Intramuscular Q24H   vancomycin 20 mg/kg Intravenous Q8H     Continuous IV Infusions:    lactated ringers 75 mL/hr Intravenous Continuous     PRN Meds:       None    Network Utilization Review Department  [email or web address]  ATTENTION: Please call with any questions or concerns to [phone] and carefully listen to the prompts so that you are directed to the right person  All voicemails are confidential   [facility] Doing all requests for admission clinical reviews, approved or denied determinations and any other requests to dedicated fax number below belonging to the campus where the patient is receiving treatment   List of dedicated fax numbers for the Facilities:  [address] DENIALS (Administrative/Medical Necessity) [phone]   [address] (Maternity/NICU/Pediatrics) [facility] [address] [phone] Devika [facility] [phone]   [facility] [address] [facility] [phone]   [facility]  [phone] [address] [facility]  [phone]   [address] [place] [address] 1000 [facility] [phone]

## 2020-07-09 NOTE — ANESTHESIA PROCEDURE NOTES
Spinal Block    Patient location during procedure: OB  Start time: 7/9/2020 7:13 PM  Reason for block: procedure for pain and at surgeon's request  Staffing  Anesthesiologist: Benito Masterson MD  Resident/CRNA: Carrie Parks CRNA  Performed: resident/CRNA   Preanesthetic Checklist  Completed: patient identified, site marked, surgical consent, pre-op evaluation, timeout performed, IV checked, risks and benefits discussed and monitors and equipment checked  Spinal Block  Patient position: sitting  Prep: ChloraPrep  Patient monitoring: cardiac monitor and frequent blood pressure checks  Approach: midline  Location: L3-4  Injection technique: single-shot  Needle  Needle type: pencil-tip   Needle gauge: 25 G  Needle length: 10 cm  Assessment  Sensory level: T4  Injection Assessment:  negative aspiration for heme, no paresthesia on injection and positive aspiration for clear CSF  Post-procedure:  adhesive bandage applied, pressure dressing applied, secured with tape, site cleaned and sterile dressing applied  Additional Notes  Attempt x1  Unremarkable procedure

## 2020-07-09 NOTE — UTILIZATION REVIEW
Continued Stay Review    OBS 07-08-20 @ 1731 CONVERTED TO INPATIENT ADMISSION 07-09-20 @ 0933 FOR CONTINUATION FOR CARE  OF MONO/DI TWINS WITH 22 % DISCORDANCE GROWTH IN TWIN B AND POSSIBLE PTL    Inpatient Admission Once     Transfer Service: OB/GYN       Question Answer Comment   Admitting Physician Pedrito Breen of Care Med Surg    Estimated length of stay More than 2 Midnights    Certification I certify that inpatient services are medically necessary for this patient for a duration of greater than two midnights  See H&P and MD Progress Notes for additional information about the patient's course of treatment          07/09/20 0933         Date:  07-09-20                         Current Patient Class: INPATIENT  Current Level of Care: MEDICAL     HPI:25 y o  female initially admitted on  07-08-20 as observation status and converted to inpatient 07-09-20  For continuation of care for discordance growth of twins B and PTL @ 33 3/7 wks and possible pre-eclampsia    Assessment/Plan:   BP's somewhat under control  Urine p/c 1 14 s/p 1 dose BTM  2nd one today   (+) contraction  With cervical change  2/70/-1  To 3/70/ -1  Plan continue to externally monitor if more cervical changes will start mgso 4  For neuro protection  Plan induction of labor @ 34 wks  Or sooner if indicated   Pertinent Labs/Diagnostic Results:       Results from last 7 days   Lab Units 07/08/20  1621   WBC Thousand/uL 8 46   HEMOGLOBIN g/dL 12 1   HEMATOCRIT % 34 7*   PLATELETS Thousands/uL 146*         Results from last 7 days   Lab Units 07/08/20  1621   SODIUM mmol/L 135*   POTASSIUM mmol/L 3 9   CHLORIDE mmol/L 102   CO2 mmol/L 21   ANION GAP mmol/L 12   BUN mg/dL 8   CREATININE mg/dL 0 60   EGFR ml/min/1 73sq m 128   CALCIUM mg/dL 8 3     Results from last 7 days   Lab Units 07/08/20  1621   AST U/L 23   ALT U/L 17   ALK PHOS U/L 182*   TOTAL PROTEIN g/dL 5 5*   ALBUMIN g/dL 2 7*   TOTAL BILIRUBIN mg/dL 0 27         Results from last 7 days   Lab Units 07/08/20  1621   GLUCOSE RANDOM mg/dL 70       Results from last 7 days   Lab Units 07/08/20  1622   CLARITY UA  Clear   COLOR UA  Yellow   SPEC GRAV UA  1 020   PH UA  6 0   GLUCOSE UA mg/dl Negative   KETONES UA mg/dl Negative   BLOOD UA  Negative   PROTEIN UA mg/dl 30 (1+)*   NITRITE UA  Negative   BILIRUBIN UA  Negative   UROBILINOGEN UA E U /dl 0 2   LEUKOCYTES UA  Negative   WBC UA /hpf 0-1*   RBC UA /hpf 0-1*   BACTERIA UA /hpf Occasional   EPITHELIAL CELLS WET PREP /hpf Occasional   CREATININE UR mg/dL 63 0   PROTEIN UR mg/dL 72   PROT/CREAT RATIO UR  1 14*     Vital Signs:   Date/Time  Temp  Pulse  Resp  BP  SpO2  Cardiac (WDL)  Patient Position - Orthostatic VS   07/09/20 0942  --  --  --  --  --  --  --   Comment rows:   OBSERV: Dr Eligio Ramey at bedside, ultrasound performed at 07/09/20 0942   07/09/20 0937  --  --  --  --  --  --  --   Comment rows:   OBSERV: Dr Carlin Hidalgo at bedside, ultrasound performed at 07/09/20 8414   07/09/20 0925  --  --  --  --  --  --  --   Comment rows:   OBSERV: transfered to L/D for induction  at 07/09/20 0925   07/09/20 0844  98 3 °F (36 8 °C)  87  20  141/73  98 %  --  --   07/09/20 0643  --  --  --  --  --  --  --   Comment rows:   OBSERV: Dr Stewart Stewart at patient bedside  at 07/09/20 0643   07/09/20 0435  98 2 °F (36 8 °C)  83  18  139/72  --  --  --   07/09/20 0126  --  --  --  --  --  --  --   Comment rows:   OBSERV: Dr Marcell Ho at patient bedside  at 07/09/20 0126   07/09/20 0102  --  --  --  --  --  --  --   Comment rows:   OBSERV: Dr Marcell Ho at patient bedside  at 07/09/20 0102   07/09/20 0026  98 4 °F (36 9 °C)  72  16  137/78  --  --  --   07/09/20 0011  --  --  --  --  --  --  --   Comment rows:   OBSERV: RN spoke with Dr Marcell Ho about contraction pattern, patient has no c/o contraction discomfort, patient visibly comfortable and in no apparent distress, MD to RN okay to disconfinue fetal monitoring   at 07/09/20 0011 07/08/20 2043  99 2 °F (37 3 °C)  73 16  135/64  98 %  --  Sitting   07/08/20 2015  --  --  --  --  --  WDL  --   07/08/20 1942  --  88  --  136/63  --  --  --   07/08/20 1927  --  89  --  139/68  --  --  --   07/08/20 1912  --  81  --  135/66  --  --  --   07/08/20 1900  --  85  --  139/68  --  --  --   07/08/20 1842  --  85  --  135/63  --  --  --   07/08/20 1828  --  71  --  143/66  --  --  --   07/08/20 1815  --  81  --  136/62  --  --  --   07/08/20 1800  --  88  --  135/66  --  --  --   07/08/20 1743  --  82  --  134/60  --  --  --   07/08/20 1728  --  78  --  137/62               Medications:   Scheduled Medications:    Medications:  betamethasone acetate-betamethasone sodium phosphate 12 mg Intramuscular Q24H   vancomycin 20 mg/kg Intravenous Q8H     Continuous IV Infusions:    lactated ringers 75 mL/hr Intravenous Continuous     PRN Meds:       Discharge Plan:home after delivery    Network Utilization Review Department  [email or web address]  ATTENTION: Please call with any questions or concerns to [phone] and carefully listen to the prompts so that you are directed to the right person  All voicemails are confidential   Dago Ayala all requests for admission clinical reviews, approved or denied determinations and any other requests to dedicated fax number below belonging to the campus where the patient is receiving treatment   List of dedicated fax numbers for the Facilities:  FACILITY NAME UR FAX NUMBER   ADMISSION DENIALS (Administrative/Medical Necessity) [phone]   [address]  (Maternity/NICU/Pediatrics) [phone]     Meagan Jiang [phone]   Apurva Montiel [phone]   [facility] [address] [place] [address] [address] [address] [place]  [address] [facility] Gurwinder Fang [address] [facility] [phone]

## 2020-07-09 NOTE — QUICK NOTE
Jefferson Duval is now becoming more uncomfortable w/ contractions  Lofall q 2-4 mins, both FHT Category I  SVE 4/70/-1  Stadol ordered for pain control  TAUS earlier this morning showed VTX/Transverse presentation  Given presentation and growth discordance between twin A and twin B, Dr Soren Ramirez and Dr Merry Moore recommend delivery via 1LTCS if patient continues to make cervical change  Plan to recheck cervix at 1630 and proceed w/ 1LTCS if cervical change appreciated  Nursing aware      Nubia Alejandro MD  OB/GYN PGY-3  7/9/2020  3:40 PM

## 2020-07-09 NOTE — PLAN OF CARE
Problem: PAIN - ADULT  Goal: Verbalizes/displays adequate comfort level or baseline comfort level  Description  Interventions:  - Encourage patient to monitor pain and request assistance  - Assess pain using appropriate pain scale  - Administer analgesics based on type and severity of pain and evaluate response  - Implement non-pharmacological measures as appropriate and evaluate response  - Consider cultural and social influences on pain and pain management  - Notify physician/advanced practitioner if interventions unsuccessful or patient reports new pain  Outcome: Progressing     Problem: INFECTION - ADULT  Goal: Absence or prevention of progression during hospitalization  Description  INTERVENTIONS:  - Assess and monitor for signs and symptoms of infection  - Monitor lab/diagnostic results  - Monitor all insertion sites, i e  indwelling lines, tubes, and drains  - Monitor endotracheal if appropriate and nasal secretions for changes in amount and color  - [place] appropriate cooling/warming therapies per order  - Administer medications as ordered  - Instruct and encourage patient and family to use good hand hygiene technique  - Identify and instruct in appropriate isolation precautions for identified infection/condition  Outcome: Progressing  Goal: Absence of fever/infection during neutropenic period  Description  INTERVENTIONS:  - Monitor WBC    Outcome: Progressing     Problem: SAFETY ADULT  Goal: Patient will remain free of falls  Description  INTERVENTIONS:  - Assess patient frequently for physical needs  -  Identify cognitive and physical deficits and behaviors that affect risk of falls    -  [place] fall precautions as indicated by assessment   - Educate patient/family on patient safety including physical limitations  - Instruct patient to call for assistance with activity based on assessment  - Modify environment to reduce risk of injury  - Consider OT/PT consult to assist with strengthening/mobility  Outcome: Progressing  Goal: Maintain or return to baseline ADL function  Description  INTERVENTIONS:  -  Assess patient's ability to carry out ADLs; assess patient's baseline for ADL function and identify physical deficits which impact ability to perform ADLs (bathing, care of mouth/teeth, toileting, grooming, dressing, etc )  - Assess/evaluate cause of self-care deficits   - Assess range of motion  - Assess patient's mobility; develop plan if impaired  - Assess patient's need for assistive devices and provide as appropriate  - Encourage maximum independence but intervene and supervise when necessary  - Involve family in performance of ADLs  - Assess for home care needs following discharge   - Consider OT consult to assist with ADL evaluation and planning for discharge  - Provide patient education as appropriate  Outcome: Progressing  Goal: Maintain or return mobility status to optimal level  Description  INTERVENTIONS:  - Assess patient's baseline mobility status (ambulation, transfers, stairs, etc )    - Identify cognitive and physical deficits and behaviors that affect mobility  - Identify mobility aids required to assist with transfers and/or ambulation (gait belt, sit-to-stand, lift, walker, cane, etc )  - [place] fall precautions as indicated by assessment  - Record patient progress and toleration of activity level on Mobility SBAR; progress patient to next Phase/Stage  - Instruct patient to call for assistance with activity based on assessment  - Consider rehabilitation consult to assist with strengthening/weightbearing, etc   Outcome: Progressing     Problem: Knowledge Deficit  Goal: Patient/family/caregiver demonstrates understanding of disease process, treatment plan, medications, and discharge instructions  Description  Complete learning assessment and assess knowledge base    Interventions:  - Provide teaching at level of understanding  - Provide teaching via preferred learning methods  Outcome: Progressing     Problem: DISCHARGE PLANNING  Goal: Discharge to home or other facility with appropriate resources  Description  INTERVENTIONS:  - Identify barriers to discharge w/patient and caregiver  - Arrange for needed discharge resources and transportation as appropriate  - Identify discharge learning needs (meds, wound care, etc )  - Arrange for interpretive services to assist at discharge as needed  - Refer to Case Management Department for coordinating discharge planning if the patient needs post-hospital services based on physician/advanced practitioner order or complex needs related to functional status, cognitive ability, or social support system  Outcome: Progressing     Problem: ANTEPARTUM  Goal: Maintain pregnancy as long as maternal and/or fetal condition is stable  Description  INTERVENTIONS:  - Maternal surveillance  - Fetal surveillance  - Monitor uterine activity  - Medications as ordered  - Bedrest  Outcome: Progressing

## 2020-07-09 NOTE — PLAN OF CARE
Problem: PAIN - ADULT  Goal: Verbalizes/displays adequate comfort level or baseline comfort level  Description  Interventions:  - Encourage patient to monitor pain and request assistance  - Assess pain using appropriate pain scale  - Administer analgesics based on type and severity of pain and evaluate response  - Implement non-pharmacological measures as appropriate and evaluate response  - Consider cultural and social influences on pain and pain management  - Notify physician/advanced practitioner if interventions unsuccessful or patient reports new pain  Outcome: Progressing     Problem: INFECTION - ADULT  Goal: Absence or prevention of progression during hospitalization  Description  INTERVENTIONS:  - Assess and monitor for signs and symptoms of infection  - Monitor lab/diagnostic results  - Monitor all insertion sites, i e  indwelling lines, tubes, and drains  - Monitor endotracheal if appropriate and nasal secretions for changes in amount and color  - [place] appropriate cooling/warming therapies per order  - Administer medications as ordered  - Instruct and encourage patient and family to use good hand hygiene technique  - Identify and instruct in appropriate isolation precautions for identified infection/condition  Outcome: Progressing  Goal: Absence of fever/infection during neutropenic period  Description  INTERVENTIONS:  - Monitor WBC    Outcome: Progressing     Problem: SAFETY ADULT  Goal: Patient will remain free of falls  Description  INTERVENTIONS:  - Assess patient frequently for physical needs  -  Identify cognitive and physical deficits and behaviors that affect risk of falls    -  [place] fall precautions as indicated by assessment   - Educate patient/family on patient safety including physical limitations  - Instruct patient to call for assistance with activity based on assessment  - Modify environment to reduce risk of injury  - Consider OT/PT consult to assist with strengthening/mobility  Outcome: Progressing  Goal: Maintain or return to baseline ADL function  Description  INTERVENTIONS:  -  Assess patient's ability to carry out ADLs; assess patient's baseline for ADL function and identify physical deficits which impact ability to perform ADLs (bathing, care of mouth/teeth, toileting, grooming, dressing, etc )  - Assess/evaluate cause of self-care deficits   - Assess range of motion  - Assess patient's mobility; develop plan if impaired  - Assess patient's need for assistive devices and provide as appropriate  - Encourage maximum independence but intervene and supervise when necessary  - Involve family in performance of ADLs  - Assess for home care needs following discharge   - Consider OT consult to assist with ADL evaluation and planning for discharge  - Provide patient education as appropriate  Outcome: Progressing  Goal: Maintain or return mobility status to optimal level  Description  INTERVENTIONS:  - Assess patient's baseline mobility status (ambulation, transfers, stairs, etc )    - Identify cognitive and physical deficits and behaviors that affect mobility  - Identify mobility aids required to assist with transfers and/or ambulation (gait belt, sit-to-stand, lift, walker, cane, etc )  - [place] fall precautions as indicated by assessment  - Record patient progress and toleration of activity level on Mobility SBAR; progress patient to next Phase/Stage  - Instruct patient to call for assistance with activity based on assessment  - Consider rehabilitation consult to assist with strengthening/weightbearing, etc   Outcome: Progressing     Problem: Knowledge Deficit  Goal: Patient/family/caregiver demonstrates understanding of disease process, treatment plan, medications, and discharge instructions  Description  Complete learning assessment and assess knowledge base    Interventions:  - Provide teaching at level of understanding  - Provide teaching via preferred learning methods  Outcome: Progressing  Goal: Verbalizes understanding of labor plan  Description  Assess patient/family/caregiver's baseline knowledge level and ability to understand information  Provide education via patient/family/caregiver's preferred learning method at appropriate level of understanding  1  Provide teaching at level of understanding  2  Provide teaching via preferred learning method(s)  Outcome: Progressing     Problem: DISCHARGE PLANNING  Goal: Discharge to home or other facility with appropriate resources  Description  INTERVENTIONS:  - Identify barriers to discharge w/patient and caregiver  - Arrange for needed discharge resources and transportation as appropriate  - Identify discharge learning needs (meds, wound care, etc )  - Arrange for interpretive services to assist at discharge as needed  - Refer to Case Management Department for coordinating discharge planning if the patient needs post-hospital services based on physician/advanced practitioner order or complex needs related to functional status, cognitive ability, or social support system  Outcome: Progressing     Problem: Labor & Delivery  Goal: Manages discomfort  Description  Assess and monitor for signs and symptoms of discomfort  Assess patient's pain level regularly and per hospital policy  Administer medications as ordered  Support use of nonpharmacological methods to help control pain such as distraction, imagery, relaxation, and application of heat and cold  Collaborate with interdisciplinary team and patient to determine appropriate pain management plan  1  Include patient in decisions related to comfort  2  Offer non-pharmacological pain management interventions  3  Report ineffective pain management to physician  Outcome: Progressing  Goal: Patient vital signs are stable  Description  1  Assess vital signs - vaginal delivery    Outcome: Progressing     Problem: BIRTH - VAGINAL/ SECTION  Goal: Fetal and maternal status remain reassuring during the birth process  Description  INTERVENTIONS:  - Monitor vital signs  - Monitor fetal heart rate  - Monitor uterine activity  - Monitor labor progression (vaginal delivery)  - DVT prophylaxis  - Antibiotic prophylaxis  Outcome: Progressing  Goal: Emotionally satisfying birthing experience for mother/fetus  Description  Interventions:  - Assess, plan, implement and evaluate the nursing care given to the patient in labor  - Advocate the philosophy that each childbirth experience is a unique experience and support the family's chosen level of involvement and control during the labor process   - Actively participate in both the patient's and family's teaching of the birth process  - Consider cultural, Denominational and age-specific factors and plan care for the patient in labor  Outcome: Progressing     Problem: ANTEPARTUM  Goal: Maintain pregnancy as long as maternal and/or fetal condition is stable  Description  INTERVENTIONS:  - Maternal surveillance  - Fetal surveillance  - Monitor uterine activity  - Medications as ordered  - Bedrest  Outcome: Completed

## 2020-07-09 NOTE — PROGRESS NOTES
Progress Note - OB/GYN  Justo Santamaria 26 y o  female MRN: [id number]  Unit/Bed#: -01 Encounter: [id number]    Assessment:  26 y o   at 33w3dw/ mono-di TIUP admitted with pre-eclampsia w/o SF  Hospital day #2    Plan:  #1  Pre-eclampsia w/o SF:  - Blood pressures 130s-140s/60s-70s  - Urine P/C 1 14  - Repeat labs today @ 1400    #2  Mono-Di TIUP @ 33w3d:   - CEFM  - Having contractions, SVE  -> 3/70/-1  - Collect GBS this AM  - Likely in  labor  - S/p BMT yesterday, due for 2nd dose this AM    Subjective/Objective      Subjective:     Contractions improved after stadol, but she is starting to feel them again  Light vaginal spotting since her checks yesterday  No LOF  +fetal movement x2  Objective:     Vitals:   Temp:  [97 5 °F (36 4 °C)-99 2 °F (37 3 °C)] 98 2 °F (36 8 °C)  HR:  [] 83  Resp:  [16-20] 18  BP: (125-152)/(60-96) 139/72     Physical Exam:     Physical Exam   Constitutional: She is oriented to person, place, and time  She appears well-developed and well-nourished  No distress  Cardiovascular: Normal rate, regular rhythm and normal heart sounds  Pulmonary/Chest: Effort normal and breath sounds normal  No respiratory distress  Genitourinary:   Genitourinary Comments: SVE 3/70/-1   Neurological: She is alert and oriented to person, place, and time  Skin: Skin is warm and dry  She is not diaphoretic  Psychiatric: She has a normal mood and affect  Her behavior is normal          Fetal Assessment Baby A:  FHT: 125 / Moderate 6 - 25 bpm / accels, no decels  [place]: q2-4    Fetal Assessment Baby B:  FHT: 120 / Moderate 6 - 25 bpm / accels, no decels-> very reactive this AM and difficult to determine baseline   [place]: q2-4    Lab, Imaging and other studies: I have personally reviewed pertinent reports        Lab Results   Component Value Date    WBC 8 46 2020    HGB 12 1 2020    HCT 34 7 (L) 2020    MCV 94 2020     (L) 2020 Lab Results   Component Value Date    GLUCOSE 94 02/15/2016    CALCIUM 8 3 07/08/2020     02/15/2016    K 3 9 07/08/2020    CO2 21 07/08/2020     07/08/2020    BUN 8 07/08/2020    CREATININE 0 60 07/08/2020       Lab Results   Component Value Date    ALT 17 07/08/2020    AST 23 07/08/2020    ALKPHOS 182 (H) 07/08/2020    BILITOT 0 4 02/15/2016       Loretta Cuadra MD  OBGYN, PGY-3  7/9/2020  6:32 AM

## 2020-07-09 NOTE — ANESTHESIA PREPROCEDURE EVALUATION
Review of Systems/Medical History  Patient summary reviewed  Chart reviewed  No history of anesthetic complications     Cardiovascular  Negative cardio ROS Exercise tolerance (METS): good,  No angina , No SOLANO,   Comment: H/o POTS,  Pulmonary  Negative pulmonary ROS Not a smoker , No shortness of breath, No recent URI ,        GI/Hepatic  Negative GI/hepatic ROS          Negative  ROS        Endo/Other    Obesity    GYN  Currently pregnant ,          Hematology   Musculoskeletal  Negative musculoskeletal ROS        Neurology  Negative neurology ROS      Psychology   Depression ,              Physical Exam    Airway    Mallampati score: I  TM Distance: >3 FB  Neck ROM: full     Dental   No notable dental hx     Cardiovascular  Comment: Negative ROS,     Pulmonary      Other Findings        Anesthesia Plan  ASA Score- 2     Anesthesia Type- spinal with ASA Monitors  Additional Monitors:   Airway Plan:         Plan Factors-    Induction- intravenous  Postoperative Plan-     Informed Consent- Anesthetic plan and risks discussed with patient  I personally reviewed this patient with the CRNA  Discussed and agreed on the Anesthesia Plan with the CRNA  James Barnes

## 2020-07-10 LAB
ALBUMIN SERPL BCP-MCNC: 2.1 G/DL (ref 3.5–5)
ALP SERPL-CCNC: 147 U/L (ref 46–116)
ALT SERPL W P-5'-P-CCNC: 19 U/L (ref 12–78)
ANION GAP SERPL CALCULATED.3IONS-SCNC: 8 MMOL/L (ref 4–13)
AST SERPL W P-5'-P-CCNC: 32 U/L (ref 5–45)
BILIRUB SERPL-MCNC: 0.23 MG/DL (ref 0.2–1)
BUN SERPL-MCNC: 16 MG/DL (ref 5–25)
CALCIUM SERPL-MCNC: 7.6 MG/DL (ref 8.3–10.1)
CHLORIDE SERPL-SCNC: 100 MMOL/L (ref 100–108)
CO2 SERPL-SCNC: 23 MMOL/L (ref 21–32)
CREAT SERPL-MCNC: 0.68 MG/DL (ref 0.6–1.3)
ERYTHROCYTE [DISTWIDTH] IN BLOOD BY AUTOMATED COUNT: 12.3 % (ref 11.6–15.1)
GFR SERPL CREATININE-BSD FRML MDRD: 123 ML/MIN/1.73SQ M
GLUCOSE SERPL-MCNC: 108 MG/DL (ref 65–140)
GP B STREP DNA SPEC QL NAA+PROBE: NORMAL
HCT VFR BLD AUTO: 34.1 % (ref 34.8–46.1)
HGB BLD-MCNC: 11.8 G/DL (ref 11.5–15.4)
MCH RBC QN AUTO: 32.6 PG (ref 26.8–34.3)
MCHC RBC AUTO-ENTMCNC: 34.6 G/DL (ref 31.4–37.4)
MCV RBC AUTO: 94 FL (ref 82–98)
PLATELET # BLD AUTO: 164 THOUSANDS/UL (ref 149–390)
PMV BLD AUTO: 11.3 FL (ref 8.9–12.7)
POTASSIUM SERPL-SCNC: 4.9 MMOL/L (ref 3.5–5.3)
PROT SERPL-MCNC: 4.6 G/DL (ref 6.4–8.2)
RBC # BLD AUTO: 3.62 MILLION/UL (ref 3.81–5.12)
SODIUM SERPL-SCNC: 131 MMOL/L (ref 136–145)
WBC # BLD AUTO: 17.44 THOUSAND/UL (ref 4.31–10.16)

## 2020-07-10 PROCEDURE — 99024 POSTOP FOLLOW-UP VISIT: CPT | Performed by: OBSTETRICS & GYNECOLOGY

## 2020-07-10 PROCEDURE — 85027 COMPLETE CBC AUTOMATED: CPT | Performed by: OBSTETRICS & GYNECOLOGY

## 2020-07-10 PROCEDURE — 80053 COMPREHEN METABOLIC PANEL: CPT | Performed by: OBSTETRICS & GYNECOLOGY

## 2020-07-10 RX ADMIN — DOCUSATE SODIUM 100 MG: 100 CAPSULE, LIQUID FILLED ORAL at 19:34

## 2020-07-10 RX ADMIN — KETOROLAC TROMETHAMINE 30 MG: 30 INJECTION, SOLUTION INTRAMUSCULAR at 14:40

## 2020-07-10 RX ADMIN — DOCUSATE SODIUM 100 MG: 100 CAPSULE, LIQUID FILLED ORAL at 09:05

## 2020-07-10 RX ADMIN — SODIUM CHLORIDE, SODIUM LACTATE, POTASSIUM CHLORIDE, AND CALCIUM CHLORIDE 125 ML/HR: .6; .31; .03; .02 INJECTION, SOLUTION INTRAVENOUS at 10:45

## 2020-07-10 RX ADMIN — SODIUM CHLORIDE, SODIUM LACTATE, POTASSIUM CHLORIDE, AND CALCIUM CHLORIDE 125 ML/HR: .6; .31; .03; .02 INJECTION, SOLUTION INTRAVENOUS at 02:34

## 2020-07-10 RX ADMIN — KETOROLAC TROMETHAMINE 30 MG: 30 INJECTION, SOLUTION INTRAMUSCULAR at 07:56

## 2020-07-10 RX ADMIN — KETOROLAC TROMETHAMINE 30 MG: 30 INJECTION, SOLUTION INTRAMUSCULAR at 02:34

## 2020-07-10 RX ADMIN — IBUPROFEN 600 MG: 600 TABLET ORAL at 19:34

## 2020-07-10 NOTE — ANESTHESIA POSTPROCEDURE EVALUATION
Post-Op Assessment Note    CV Status:  Stable    Pain management: adequate     Mental Status:  Alert and awake   Hydration Status:  Euvolemic   PONV Controlled:  Controlled   Airway Patency:  Patent   Post Op Vitals Reviewed: Yes      Staff: CRNA           BP   132/65   Temp   97   Pulse  89   Resp   18   SpO2   98

## 2020-07-10 NOTE — PROGRESS NOTES
Progress Note - OB/GYN  Zeinab Persaud 26 y o  female MRN: [id number]  Unit/Bed#: -01 Encounter: [id number]      Assessment:  Postop Day #1 s/p 1LTCS for Mono-Di twins secondary to  labor  Babies are in the NICU but doing well  Plan:    1) Postoperative care   -  cc, Hgb 12g/dL --> 11 8   - Rawls in place, S/p 500 cc bolus overnight for oliguria, Urinary output now 0 51 cc/kg/hr since delivery --> Will keep rawls in place for now secondary to significant labial swelling, ice pack for swelling  - Encourage ambulation  - Encourage breastfeeding  - Anticipate discharge POD#3     2) Preeclampsia w/o SF  - Bps have been 127-148/59-87 since delivery  - Asymptomatic  - Continue to monitor BPs       Chief Complaint:    Post delivery  Patient is doing well  Lochia WNL  Pain well controlled  Subjective    Pain: well controlled  Tolerating PO: yes  Voiding:  Rawls in   Flatus: yes  BM: no  Ambulating: no  Breastfeeding:  yes  Chest pain: no  Shortness of breath: no  Leg pain: no  Lochia: minimal     Vitals:   /72 (BP Location: Left arm) Comment: notified nurse  Pulse 58   Temp 98 °F (36 7 °C) (Oral)   Resp 18   Ht 5' 3" (1 6 m)   Wt 80 7 kg (178 lb)   LMP 2019   SpO2 99%   Breastfeeding Yes   BMI 31 53 kg/m²       Intake/Output Summary (Last 24 hours) at 7/10/2020 0746  Last data filed at 7/10/2020 3443  Gross per 24 hour   Intake 5220 ml   Output 1143 ml   Net 4077 ml       Invasive Devices     Peripheral Intravenous Line            Peripheral IV 20 Left Hand 1 day    Peripheral IV 20 Right Hand less than 1 day          Drain            Urethral Catheter Double-lumen;Non-latex 16 Fr  less than 1 day                Physical Exam:   GEN: Zeinab Persaud appears well, alert and oriented x 3, pleasant and cooperative   ABDOMEN: soft, no tenderness, no distention, fundus firm and 2 cm below umbilicus, Incision C/D/I  EXTREMITIES: SCDs on      Labs:   Admission on 07/08/2020   Component Date Value    WBC 07/08/2020 8 46     RBC 07/08/2020 3 68*    Hemoglobin 07/08/2020 12 1     Hematocrit 07/08/2020 34 7*    MCV 07/08/2020 94     MCH 07/08/2020 32 9     MCHC 07/08/2020 34 9     RDW 07/08/2020 12 5     Platelets 17/16/4682 146*    MPV 07/08/2020 11 5     Sodium 07/08/2020 135*    Potassium 07/08/2020 3 9     Chloride 07/08/2020 102     CO2 07/08/2020 21     ANION GAP 07/08/2020 12     BUN 07/08/2020 8     Creatinine 07/08/2020 0 60     Glucose 07/08/2020 70     Calcium 07/08/2020 8 3     AST 07/08/2020 23     ALT 07/08/2020 17     Alkaline Phosphatase 07/08/2020 182*    Total Protein 07/08/2020 5 5*    Albumin 07/08/2020 2 7*    Total Bilirubin 07/08/2020 0 27     eGFR 07/08/2020 128     Clarity, UA 07/08/2020 Clear     Color, UA 07/08/2020 Yellow     Specific Gravity, UA 07/08/2020 1 020     pH, UA 07/08/2020 6 0     Glucose, UA 07/08/2020 Negative     Ketones, UA 07/08/2020 Negative     Blood, UA 07/08/2020 Negative     Protein, UA 07/08/2020 30 (1+)*    Nitrite, UA 07/08/2020 Negative     Bilirubin, UA 07/08/2020 Negative     Urobilinogen, UA 07/08/2020 0 2     Leukocytes, UA 07/08/2020 Negative     WBC, UA 07/08/2020 0-1*    RBC, UA 07/08/2020 0-1*    Bacteria, UA 07/08/2020 Occasional     Epithelial Cells 07/08/2020 Occasional     Creatinine, Ur 07/08/2020 63 0     Protein Urine Random 07/08/2020 72     Prot/Creat Ratio, Ur 07/08/2020 1 14*    Urine Culture 07/08/2020 No Growth <1000 cfu/mL     ABO Grouping 07/09/2020 O     Rh Factor 07/09/2020 Positive     Antibody Screen 07/09/2020 Negative     Specimen Expiration Date 07/09/2020 [id number]     Creatinine, Ur 07/09/2020 364 0     Protein Urine Random 07/09/2020 171     Prot/Creat Ratio, Ur 07/09/2020 0 47*    WBC 07/09/2020 13 66*    RBC 07/09/2020 3 68*    Hemoglobin 07/09/2020 12 0     Hematocrit 07/09/2020 35 1     MCV 07/09/2020 95     MCH 07/09/2020 32 6     MCHC 07/09/2020 34 2     RDW 07/09/2020 12 7     MPV 07/09/2020 11 3     Platelets 29/74/5045 164     nRBC 07/09/2020 0     Sodium 07/09/2020 131*    Potassium 07/09/2020 4 2     Chloride 07/09/2020 99*    CO2 07/09/2020 21     ANION GAP 07/09/2020 11     BUN 07/09/2020 12     Creatinine 07/09/2020 0 72     Glucose 07/09/2020 105     Calcium 07/09/2020 7 9*    AST 07/09/2020 25     ALT 07/09/2020 18     Alkaline Phosphatase 07/09/2020 188*    Total Protein 07/09/2020 5 7*    Albumin 07/09/2020 2 6*    Total Bilirubin 07/09/2020 0 29     eGFR 07/09/2020 118     Segmented % 07/09/2020 84*    Bands % 07/09/2020 1     Lymphocytes % 07/09/2020 9*    Monocytes % 07/09/2020 6     Eosinophils, % 07/09/2020 0     Basophils % 07/09/2020 0     Absolute Neutrophils 07/09/2020 11 61*    Lymphocytes Absolute 07/09/2020 1 23     Monocytes Absolute 07/09/2020 0 82     Eosinophils Absolute 07/09/2020 0 00     Basophils Absolute 07/09/2020 0 00     Total Counted 07/09/2020 100     RBC Morphology 07/09/2020 Normal     Platelet Estimate 25/62/7594 Adequate     pH, Cord Sandoval 07/09/2020 7  348     pCO2, Cord Sandoval 07/09/2020 41 1     pO2, Cord Sandoval 07/09/2020 22 9     HCO3, Cord Sandoval 07/09/2020 22 1    [facility] Exc, Cord Sandoval 07/09/2020 -3 3*    O2 Cont, Cord Sandoval 07/09/2020 13 0     O2 HGB,VENOUS CORD 07/09/2020 56 2     pH, Cord Art 07/09/2020 7 328     pCO2, Cord Art 07/09/2020 44 4     pO2, Cord Art 07/09/2020 17 7     HCO3, Cord Art 07/09/2020 22 8     Base Exc, Cord Art 07/09/2020 -3 3*    O2 Content, Cord Art 07/09/2020 9 2     O2 Hgb, Arterial Cord 07/09/2020 40 0     pH, Cord Sandoval 07/09/2020 7 339     pCO2, Cord Sandoval 07/09/2020 41 1     pO2, Cord Sandoval 07/09/2020 21 5     HCO3, Cord Sandoval 07/09/2020 21 6    [facility] Exc, Cord Sandoval 07/09/2020 -3 9*    O2 Cont, Cord Sandoval 07/09/2020 11 9     O2 HGB,VENOUS CORD 07/09/2020 52 4     pH, Cord Art 07/09/2020 7 306     pCO2, Cord Art 07/09/2020 38 3  pO2, Cord Art 2020 18 3     HCO3, Cord Art 2020 18 7     Base Exc, Cord Art 2020 -7 0*    O2 Content, Cord Art 2020 8 9     O2 Hgb, Arterial Cord 2020 39 5     WBC 07/10/2020 17 44*    RBC 07/10/2020 3 62*    Hemoglobin 07/10/2020 11 8     Hematocrit 07/10/2020 34 1*    MCV 07/10/2020 94     MCH 07/10/2020 32 6     MCHC 07/10/2020 34 6     RDW 07/10/2020 12 3     Platelets  164     MPV 07/10/2020 11 3     Sodium 07/10/2020 131*    Potassium 07/10/2020 4 9     Chloride 07/10/2020 100     CO2 07/10/2020 23     ANION GAP 07/10/2020 8     BUN 07/10/2020 16     Creatinine 07/10/2020 0 68     Glucose 07/10/2020 108     Calcium 07/10/2020 7 6*    AST 07/10/2020 32     ALT 07/10/2020 19     Alkaline Phosphatase 07/10/2020 147*    Total Protein 07/10/2020 4 6*    Albumin 07/10/2020 2 1*    Total Bilirubin 07/10/2020 0 23     eGFR 07/10/2020 123          Patient Active Problem List   Diagnosis    Depression    Abnormal cardiac valve    Gestational hypertension    Velamentous insertion of umbilical cord in second trimester    Encounter for ultrasound to check fetal growth    S/P  section    Monochorionic diamniotic twin gestation          Rosa Boone MD  7/10/2020  7:46 AM

## 2020-07-10 NOTE — PLAN OF CARE
Problem: PAIN - ADULT  Goal: Verbalizes/displays adequate comfort level or baseline comfort level  Description  Interventions:  - Encourage patient to monitor pain and request assistance  - Assess pain using appropriate pain scale  0-10  - Administer analgesics based on type and severity of pain and evaluate response  - Implement non-pharmacological measures as appropriate and evaluate response  - Consider cultural and social influences on pain and pain management  - Notify physician/advanced practitioner if interventions unsuccessful or patient reports new pain   Outcome: Progressing     Problem: INFECTION - ADULT  Goal: Absence or prevention of progression during hospitalization  Description  INTERVENTIONS:  - Assess and monitor for signs and symptoms of infection  - Monitor lab/diagnostic results  - Monitor all insertion sites, i e  indwelling lines  - [place] appropriate cooling/warming therapies per order  - Administer medications as ordered  - Instruct and encourage patient and family to use good hand hygiene technique   Outcome: Progressing  Goal: Absence of fever/infection during neutropenic period  Description  INTERVENTIONS:  - Monitor WBC    Outcome: Progressing     Problem: SAFETY ADULT  Goal: Patient will remain free of falls  Description  INTERVENTIONS:  - Assess patient frequently for physical needs  -  Identify cognitive and physical deficits and behaviors that affect risk of falls    -  [place] fall precautions as indicated by assessment   - Educate patient/family on patient safety including physical limitations  - Instruct patient to call for assistance with activity based on assessment  - Modify environment to reduce risk of injury   Outcome: Progressing  Goal: Maintain or return to baseline ADL function  Description  INTERVENTIONS:  -  Assess patient's ability to carry out ADLs; assess patient's baseline for ADL function and identify physical deficits which impact ability to perform ADLs (bathing, care of mouth/teeth, toileting, grooming, dressing, etc )  - Assess/evaluate cause of self-care deficits   - Assess range of motion  - Assess patient's mobility; develop plan if impaired  - Assess patient's need for assistive devices and provide as appropriate  - Encourage maximum independence but intervene and supervise when necessary  - Involve family in performance of ADLs  - Assess for home care needs following discharge  - Provide patient education as appropriate   Outcome: Progressing  Goal: Maintain or return mobility status to optimal level  Description  INTERVENTIONS:  - Assess patient's baseline mobility status (ambulation, transfers, stairs, etc )    - Identify cognitive and physical deficits and behaviors that affect mobility  - [place] fall precautions as indicated by assessment  - Instruct patient to call for assistance with activity based on assessment   Outcome: Progressing     Problem: Knowledge Deficit  Goal: Patient/family/caregiver demonstrates understanding of disease process, treatment plan, medications, and discharge instructions  Description  Complete learning assessment and assess knowledge base  Interventions:  - Provide teaching at level of understanding  - Provide teaching via preferred learning methods   7/10/2020 0917 by Brandt Banegas RN  Outcome: Progressing  7/10/2020 0914 by Brandt Banegas RN  Reactivated  Goal: Verbalizes understanding of labor plan  Description  Assess patient/family/caregiver's baseline knowledge level and ability to understand information  Provide education via patient/family/caregiver's preferred learning method at appropriate level of understanding  1  Provide teaching at level of understanding  2  Provide teaching via preferred learning method(s)     7/10/2020 0917 by Brandt Banegas RN  Outcome: Progressing  7/10/2020 0914 by Brandt Banegas RN  Reactivated     Problem: DISCHARGE PLANNING  Goal: Discharge to home or other facility with appropriate resources  Description  INTERVENTIONS:  - Identify barriers to discharge w/patient and caregiver  - Arrange for needed discharge resources and transportation as appropriate  - Identify discharge learning needs (meds, wound care, etc )  - Arrange for interpretive services to assist at discharge as needed  - Refer to Case Management Department for coordinating discharge planning if the patient needs post-hospital services based on physician/advanced practitioner order or complex needs related to functional status, cognitive ability, or social support system  Outcome: Progressing     Problem: POSTPARTUM  Goal: Experiences normal postpartum course  Description  INTERVENTIONS:  - Monitor maternal vital signs  - Assess uterine involution and lochia  Outcome: Progressing  Goal: Appropriate maternal -  bonding  Description  INTERVENTIONS:  - Identify family support  - Assess for appropriate maternal/infant bonding   -Encourage maternal/infant bonding opportunities  - Referral to  or  as needed  Outcome: Progressing  Goal: Establishment of infant feeding pattern  Description  INTERVENTIONS:  - Assess breast pumping q2-3 hrs  - Refer to lactation as needed   Outcome: Progressing  Goal: Incision(s), wounds(s) or drain site(s) healing without S/S of infection  Description  INTERVENTIONS  - Assess and document risk factors for skin impairment   - Assess and document incision and surrounding tissue  - Consider nutrition services referral as needed  - Oral mucous membranes remain intact  - Provide patient/ family education   Outcome: Progressing

## 2020-07-10 NOTE — CONSULTS
2020 1700   Consult :  Jaswinder Diggs is a 26 yr old  with Monochorionic diamniotic twin pregnancy at 35 3/7 weeks gestation who was admitted to L/D for elevated BP, r/o pre eclampsia  She met criteria for preeclampsia without severe features based on elevated blood pressure and proteinuria  She subsequently went into  labor    Pregnancy complications:   Gestational hypertension, velamentous insertion of umbilical cord  22% discordance between twin a and twin B, twin B larger    Prenatal Labs:    Blood type: O+  Antibody: negative  Group B strep: unknown  HIV: negative  Hepatitis B: negative  RPR: non-reactive  Rubella: Immune  Varicella: Immune  1 hour Glucose: 122  Platelets: 037  Hgb: 12 1     Past Medical Hx:  Depression   Abnormal Cardiac Valve   POTS(postural orthostatic tachycardia syndrome     Arron Morillo is becoming more uncomfortable w/ contractions  [place] q 2-4 mins, both FHT Category I  SVE 4/70/-1  Stadol ordered for pain control  TAUS earlier this morning showed VTX/Transverse presentation  Given presentation and growth discordance between twin A and twin B, Dr Stacey Fung and Dr Constance Jha recommend delivery via 1LTCS if patient continues to make cervical change  I spoke with mother and father about the risks of a premature  Twin infant's delivery at 35 3/7 weeks gestation, including but not limited to delivery room resuscitation (need for intubation, CPR, surfactant, UVC placement, epi), developmental delay, CP, IVH, PVL, ROP, PDA (medical vs surgical closure), RDS, CLD (long term complications discussed as well), intubation, surfactant, mechanical ventilation, NEC, feeding intolerance/immaturity, importance of BM, NG feeds, central lines, TPN, anemia, blood transfusions, sepsis, and isolette for thermoregulation  The use of donor breast milk was discussed, and the family agrees to [facility] NICU care also discussed with parent sand they  understand risks   All questions/concerns answered        Artis Cano NNP-BC   Neonatology

## 2020-07-10 NOTE — OP NOTE
Operative Report - OB/GYN   Cyndi Miranda 26 y o  female MRN: [id number]  Unit/Bed#: -01 Encounter: [id number]    Indications: Monochorionic Diamniotic Twin Gestation,  Labor, Malpresentation of Twin B, Twin Growth Discordance    Pre-operative Diagnosis:   1  33 week 4 day pregnancy  2  Monochorionic Diamniotic Twin Gestation  3  Preeclampsia without severe features    Post-operative Diagnosis: same, delivered    Surgeon: Shiloh Velasquez MD    Assistant(s): Marta Cisnreos DO    Findings:  1a  Delivery of viable female on 20 at 1933, weight 4lbs 6oz;  Apgar scores of 9 at one minute and 9 at five minutes  Umbilical artery pH 6 233 (base excess -3 3)  1b  Delivery of viable female on 20 at 1934, weight 5lbs 0oz;  Apgar scores of 8 at one minute and 10 at five minutes  Umbilical artery pH 0 962 (base excess -7 0)  2  Normal appearing placenta with centrally-inserted 3 vessel cord  3  Clear amniotic fluid  4  Grossly normal uterus, tubes, and ovaries    Specimens:   1  Arterial and venous cord gases  2  Cord blood  3  Segment of umbilical cord  4  Placenta to storage     Quantified blood loss (mL): 503    Drains: Britton catheter           Complications:  None; patient tolerated the procedure well  Disposition: PACU            Condition: stable    Procedure Details   Decision was made to proceed with  section due to , labor in the setting of monochorionic  diamniotic twins with growth discordance and baby B in transverse position  Recommendation was made for primary low transverse  section for route of delivery  Patient was made aware of these findings and the proposed plan  Risks, benefits, possible complications, alternate treatment options, and expected outcomes were discussed with the patient  The patient agreed with the proposed plan and gave informed consent        The patient was taken to the operating room where she was properly identified to the OR staff and attending physician  She received spinal anesthesia preoperatively  Fetal heart tones were appreciated and found to be appropriate  A Britton catheter was aseptically inserted and SCDs were placed  The vagina was prepped with betadine and the abdomen was prepped with Chloraprep  Following appropriate drying time, the patient was draped in the usual sterile manner for a Pfannenstiel incision  The patient had received Ancef 2 g IV pre-operatively for prophylaxis  A Time Out was held and the above information confirmed  The patient was identified as Sara Robles and the procedure verified as  Delivery  A Pfannenstiel incision was made and carried down through the underlying subcutaneous tissue to the fascia using a scalpel and bovie electrocautery  Rectus fascia was then incised in the midline and extended laterally using Yoon scissors  The superior edge of the fascia was grasped with Kocher clamps, tented upward, and the underlying muscle was dissected of using bovie electrocautery  The inferior edge was grasped with Kocher clamps and cleared in similar fashion  All anatomic layers were well-demarcated  The rectus muscles were  and the peritoneum was identified and subsequently entered and extended longitudinally with blunt dissection  The vesicouterine peritoneum was identified and a bladder flap was created using Metzenbaum scissors  The bladder blade was inserted  A low transverse uterine incision was made with the scalpel and extended laterally with blunt dissection  The amnion was entered sharply  Surgeons hand was inserted through the hysterotomy and the fetal head  Of baby A was palpated, elevated, and delivered through the uterine incision with the assistance of fundal pressure  Baby had spontaneous cry with good color and tone  The umbilical cord was clamped and cut  The infant was handed off to the  providers        Next, the amniotic membranes of twin B was entered  Surgeons hand was inserted through the hysterotomy and the fetal head of baby B was palpated, elevated, and delivered through the uterine incision with the assistance of fundal pressure  Baby had spontaneous cry with good color and tone  The umbilical cord was clamped and cut  The infant was handed off to the  providers  Arterial and venous cord gases, cord blood, and a segment of umbilical cord were obtained for evaluation and promptly sent to the lab  The placenta delivered spontaneously with uterine fundal massage and was noted to have a centrally inserted 3 vessel cord  This was also sent to the lab for placental pathology  The uterus was exteriorized and a moist lap sponge was used to clear the cavity of clots and products of conception  The uterine incision was closed with a running locked suture of 0 Vicryl  A second layer of the same suture was used to imbricate the first   Good hemostasis was confirmed upon uterine closure  The uterus was returned to the abdomen  The paracolic gutters were inspected and cleared of all clots and debris with moist lap sponges  The rectus muscle was reapproximated with 2-0 Vicryl  The fascia was closed with a running suture of 0 Vicryl  Subcutaneous tissues were closed with 2-0 Vicryl suture  The skin was closed with 4-0 Monocryl in a subcuticular fashion  Sterile dressing was applied and an abdominal binder was then placed  At the conclusion of the procedure, all needle, sponge, and instrument counts were noted to be correct x2  The patient tolerated the procedure well and was transferred to her the recovery room in stable condition  Dr Faith Oquendo was present and participated in all key portions of the case      Arnol Bradford DO

## 2020-07-10 NOTE — DISCHARGE SUMMARY
Discharge Summary - OB/GYN   Ethan Ponce 26 y o  female MRN: [id number]  Unit/Bed#: -01 Encounter: [id number]      Admission Date: 2020     Discharge Date: 2020    Admitting Diagnosis:   1  33 week 4 day pregnancy  2  Monochorionic Diamniotic Twin Gestation  3  Preeclampsia without severe features    Discharge Diagnosis:   Same, delivered    Procedures: primary  section, low transverse incision    Attending: Alexsander Mejía MD   Discharge attending: [facility] Course:     Ethan Ponce is a 26 y o  Daralene Shoulders at 33w4d wks who was initially admitted for observation in the setting of concern for evolving preeeclampsia with severe features  She met criteria for preeclampsia without severe features based on elevated blood pressure and proteinuria  She subsequently went into  labor  Due to of monochorionic  diamniotic twins with growth discordance and baby B in transverse position, recommendation was made for primary low transverse  section for route of delivery  She delivered a viable female  on 2020 at 1933  Weight 4lbs 6oz via primary  section, low transverse incision  Apgars were 9 (1 min) and 9 (5 min)   was transferred to NICU  Patient tolerated the procedure well and was transferred to recovery in stable condition  She delivered a viable female  on 2020 at 1934  Weight 5lbs 1oz via primary  section, low transverse incision  Apgars were 8 (1 min) and 9 (5 min)   was transferred to NICU  Patient tolerated the procedure well and was transferred to recovery in stable condition  Her post-operative course was uncomplicated Preoperative hemaglobin was 12 0, postoperative was 11 8  Her postoperative pain was well controlled with oral analgesics  On day of discharge, she was ambulating and able to reasonably perform all ADLs  She was voiding and had appropriate bowel function   Pain was well controlled  She was discharged home on post-operative day #3 without complications  Patient was instructed to follow up with her OB as an outpatient and was given appropriate warnings to call provider if she develops signs of infection or uncontrolled pain  Complications: none apparent    Condition at discharge: good     Discharge instructions/Information to patient and family:   See after visit summary for information provided to patient and family  Provisions for Follow-Up Care:  See after visit summary for information related to follow-up care and any pertinent home health orders  Disposition: Home    Planned Readmission: No    Discharge Medications: For a complete list of the patient's medications, please refer to her med rec      Binta Simpson MD, PGY-3  7/12/2020  9:05 AM

## 2020-07-10 NOTE — LACTATION NOTE
CONSULT - LACTATION  Lin Morrell 26 y o  female MRN: [id number]    [address] [facility] L&D Room / Bed: / 756-23 Encounter: [id number]    Maternal Information     MOTHER:  N/A  Maternal Age: This patient's mother is not on file  OB History: This patient's mother is not on file  Previouse breast reduction surgery? No    Lactation history:   Has patient previously breast fed: Yes   How long had patient previously breast fed: (6 months exclusively pumping)   Previous breast feeding complications: Other (Comment)(Infant did not latch)   This patient's mother is not on file  Birth information:  YOB: 1995   Time of birth:     Sex: female   Delivery type:     Birth Weight: No birth weight on file  Percent of Weight Change: Birth weight not on file     Gestational Age: <None>   [unfilled]      Feeding recommendations:  Lisa plans to pump every 2-3 hours and supplement with expressed colostrum via syringe and cup  Met with mother  Provided mother with Ready, Set, Baby booklet  Discussed Skin to Skin contact an benefits to mom and baby  Talked about the delay of the first bath until baby has adjusted  Spoke about the benefits of rooming in  Feeding on cue and what that means for recognizing infant's hunger  Avoidance of pacifiers for the first month discussed  Talked about exclusive breastfeeding for the first 6 months  Positioning and latch reviewed as well as showing images of other feeding positions  Discussed the properties of a good latch in any position  Reviewed hand/manual expression  Discussed s/s that baby is getting enough milk and some s/s that breastfeeding dyad may need further help  Given education on Increasing Breast Milk Supply  Demonstrated how to use the pumping log to accommodate expectations on production of breast milk  Instructions given on pumping    Discussed when to start, frequency, different pumps available versus manual expression  Discussed hygiene of hands and supplies as well as assembly, placement of flanges, size of flanged, preparing the breast and cycles and suction settings on pump  Discussed labeling of milk, storage, and preparation of stored milk  Encouraged pumping every 2-3 hours while awake  Encouraged setting an alarm to remember when to pump to accomplish 8-10 pumping sessions in a 24 hour time period  Encouraged hand expression  Gave information on common concerns, what to expect the first few weeks after delivery, preparing for other caregivers, and how partners can help  Resources for support also provided      Joe Sánhcez 7/10/2020 6:56 PM

## 2020-07-10 NOTE — PLAN OF CARE
Problem: PAIN - ADULT  Goal: Verbalizes/displays adequate comfort level or baseline comfort level  Description  Interventions:  - Encourage patient to monitor pain and request assistance  - Assess pain using appropriate pain scale  - Administer analgesics based on type and severity of pain and evaluate response  - Implement non-pharmacological measures as appropriate and evaluate response  - Consider cultural and social influences on pain and pain management  - Notify physician/advanced practitioner if interventions unsuccessful or patient reports new pain  Outcome: Progressing     Problem: INFECTION - ADULT  Goal: Absence or prevention of progression during hospitalization  Description  INTERVENTIONS:  - Assess and monitor for signs and symptoms of infection  - Monitor lab/diagnostic results  - Monitor all insertion sites, i e  indwelling lines, tubes, and drains  - Monitor endotracheal if appropriate and nasal secretions for changes in amount and color  - [place] appropriate cooling/warming therapies per order  - Administer medications as ordered  - Instruct and encourage patient and family to use good hand hygiene technique  - Identify and instruct in appropriate isolation precautions for identified infection/condition  Outcome: Progressing  Goal: Absence of fever/infection during neutropenic period  Description  INTERVENTIONS:  - Monitor WBC    Outcome: Progressing     Problem: SAFETY ADULT  Goal: Patient will remain free of falls  Description  INTERVENTIONS:  - Assess patient frequently for physical needs  -  Identify cognitive and physical deficits and behaviors that affect risk of falls    -  [place] fall precautions as indicated by assessment   - Educate patient/family on patient safety including physical limitations  - Instruct patient to call for assistance with activity based on assessment  - Modify environment to reduce risk of injury  - Consider OT/PT consult to assist with strengthening/mobility  Outcome: Progressing  Goal: Maintain or return to baseline ADL function  Description  INTERVENTIONS:  -  Assess patient's ability to carry out ADLs; assess patient's baseline for ADL function and identify physical deficits which impact ability to perform ADLs (bathing, care of mouth/teeth, toileting, grooming, dressing, etc )  - Assess/evaluate cause of self-care deficits   - Assess range of motion  - Assess patient's mobility; develop plan if impaired  - Assess patient's need for assistive devices and provide as appropriate  - Encourage maximum independence but intervene and supervise when necessary  - Involve family in performance of ADLs  - Assess for home care needs following discharge   - Consider OT consult to assist with ADL evaluation and planning for discharge  - Provide patient education as appropriate  Outcome: Progressing  Goal: Maintain or return mobility status to optimal level  Description  INTERVENTIONS:  - Assess patient's baseline mobility status (ambulation, transfers, stairs, etc )    - Identify cognitive and physical deficits and behaviors that affect mobility  - Identify mobility aids required to assist with transfers and/or ambulation (gait belt, sit-to-stand, lift, walker, cane, etc )  - [place] fall precautions as indicated by assessment  - Record patient progress and toleration of activity level on Mobility SBAR; progress patient to next Phase/Stage  - Instruct patient to call for assistance with activity based on assessment  - Consider rehabilitation consult to assist with strengthening/weightbearing, etc   Outcome: Progressing     Problem: DISCHARGE PLANNING  Goal: Discharge to home or other facility with appropriate resources  Description  INTERVENTIONS:  - Identify barriers to discharge w/patient and caregiver  - Arrange for needed discharge resources and transportation as appropriate  - Identify discharge learning needs (meds, wound care, etc )  - Arrange for interpretive services to assist at discharge as needed  - Refer to Case Management Department for coordinating discharge planning if the patient needs post-hospital services based on physician/advanced practitioner order or complex needs related to functional status, cognitive ability, or social support system  Outcome: Progressing     Problem: POSTPARTUM  Goal: Experiences normal postpartum course  Description  INTERVENTIONS:  - Monitor maternal vital signs  - Assess uterine involution and lochia  Outcome: Progressing  Goal: Appropriate maternal -  bonding  Description  INTERVENTIONS:  - Identify family support  - Assess for appropriate maternal/infant bonding   -Encourage maternal/infant bonding opportunities  - Referral to  or  as needed  Outcome: Progressing  Goal: Establishment of infant feeding pattern  Description  INTERVENTIONS:  - Assess breast/bottle feeding  - Refer to lactation as needed  Outcome: Progressing  Goal: Incision(s), wounds(s) or drain site(s) healing without S/S of infection  Description  INTERVENTIONS  - Assess and document risk factors for skin impairment   - Assess and document dressing, incision, wound bed, drain sites and surrounding tissue  - Consider nutrition services referral as needed  - Oral mucous membranes remain intact  - Provide patient/ family education  Outcome: Progressing

## 2020-07-11 PROCEDURE — 99024 POSTOP FOLLOW-UP VISIT: CPT | Performed by: OBSTETRICS & GYNECOLOGY

## 2020-07-11 RX ADMIN — DOCUSATE SODIUM 100 MG: 100 CAPSULE, LIQUID FILLED ORAL at 08:44

## 2020-07-11 RX ADMIN — ACETAMINOPHEN 650 MG: 325 TABLET, FILM COATED ORAL at 22:38

## 2020-07-11 RX ADMIN — ACETAMINOPHEN 650 MG: 325 TABLET, FILM COATED ORAL at 18:13

## 2020-07-11 RX ADMIN — IBUPROFEN 600 MG: 600 TABLET ORAL at 16:24

## 2020-07-11 RX ADMIN — DOCUSATE SODIUM 100 MG: 100 CAPSULE, LIQUID FILLED ORAL at 18:13

## 2020-07-11 RX ADMIN — IBUPROFEN 600 MG: 600 TABLET ORAL at 08:44

## 2020-07-11 RX ADMIN — IBUPROFEN 600 MG: 600 TABLET ORAL at 01:36

## 2020-07-11 RX ADMIN — IBUPROFEN 600 MG: 600 TABLET ORAL at 22:38

## 2020-07-11 NOTE — LACTATION NOTE
Mom states she continues to pump and is getting more colostrum  Stressed continued frequency of pumping

## 2020-07-11 NOTE — PROGRESS NOTES
Progress Note - OB/GYN  Devyn Zamora 26 y o  female MRN: [id number]  Unit/Bed#: -01 Encounter: [id number]      A/P: POD # 2 s/p 1LTCS , babies in NICU  1) Post operative   - QBL 503cc, Hgb 12 0 --> 11 8   - Voiding spontaneously  2) Pre-E w/o SF   - 1teen-140/60-70   - Patient asymptomatic   3) Labial swelling   - Much improved    4) Continue routine post-op care   - Encourage ambulation   - Encourage breastfeeding   - Anticipate discharge on 7/13/2020         ______________      Subjective:  Patient feeling well this morning   She has no complaints at this time    Pain: well controlled  Tolerating PO: yes  Voiding: yes  Flatus: yes  BM: no  Ambulating: yes  Breastfeeding:  yes  Chest pain: no  Shortness of breath: no  Leg pain: no  Lochia: minimal    Vitals:   /78 (BP Location: Left arm)   Pulse 65   Temp 97 8 °F (36 6 °C) (Oral)   Resp 16   Ht 5' 3" (1 6 m)   Wt 80 7 kg (178 lb)   LMP 11/18/2019   SpO2 98%   Breastfeeding Yes   BMI 31 53 kg/m²       Intake/Output Summary (Last 24 hours) at 7/11/2020 0758  Last data filed at 7/10/2020 2130  Gross per 24 hour   Intake 3056 66 ml   Output 3975 ml   Net -918 34 ml       Invasive Devices     Peripheral Intravenous Line            Peripheral IV 07/09/20 Right Hand 1 day                Physical Exam:   GEN: Devyn Zamora appears well, alert and oriented x 3, pleasant and cooperative   CARDIO: RRR, no murmurs or rubs  RESP:  CTAB, no wheezes or rales  ABDOMEN: soft, no tenderness, no distention, fundus @ -2, Incision C/D/I  EXTREMITIES: SCDs on, Negative Audra's sign bilaterally      Labs:   Admission on 07/08/2020   Component Date Value    WBC 07/08/2020 8 46     RBC 07/08/2020 3 68*    Hemoglobin 07/08/2020 12 1     Hematocrit 07/08/2020 34 7*    MCV 07/08/2020 94     MCH 07/08/2020 32 9     MCHC 07/08/2020 34 9     RDW 07/08/2020 12 5     Platelets 30/88/9834 146*    MPV 07/08/2020 11 5     Sodium 07/08/2020 135*    Potassium 07/08/2020 3 9     Chloride 07/08/2020 102     CO2 07/08/2020 21     ANION GAP 07/08/2020 12     BUN 07/08/2020 8     Creatinine 07/08/2020 0 60     Glucose 07/08/2020 70     Calcium 07/08/2020 8 3     AST 07/08/2020 23     ALT 07/08/2020 17     Alkaline Phosphatase 07/08/2020 182*    Total Protein 07/08/2020 5 5*    Albumin 07/08/2020 2 7*    Total Bilirubin 07/08/2020 0 27     eGFR 07/08/2020 128     Clarity, UA 07/08/2020 Clear     Color, UA 07/08/2020 Yellow     Specific Gravity, UA 07/08/2020 1 020     pH, UA 07/08/2020 6 0     Glucose, UA 07/08/2020 Negative     Ketones, UA 07/08/2020 Negative     Blood, UA 07/08/2020 Negative     Protein, UA 07/08/2020 30 (1+)*    Nitrite, UA 07/08/2020 Negative     Bilirubin, UA 07/08/2020 Negative     Urobilinogen, UA 07/08/2020 0 2     Leukocytes, UA 07/08/2020 Negative     WBC, UA 07/08/2020 0-1*    RBC, UA 07/08/2020 0-1*    Bacteria, UA 07/08/2020 Occasional     Epithelial Cells 07/08/2020 Occasional     Creatinine, Ur 07/08/2020 63 0     Protein Urine Random 07/08/2020 72     Prot/Creat Ratio, Ur 07/08/2020 1 14*    Urine Culture 07/08/2020 No Growth <1000 cfu/mL     ABO Grouping 07/09/2020 O     Rh Factor 07/09/2020 Positive     Antibody Screen 07/09/2020 Negative     Specimen Expiration Date 07/09/2020 [id number]     Strep Grp B PCR 07/09/2020 Negative for Beta Hemolytic Strep Grp B by PCR     Creatinine, Ur 07/09/2020 364 0     Protein Urine Random 07/09/2020 171     Prot/Creat Ratio, Ur 07/09/2020 0 47*    WBC 07/09/2020 13 66*    RBC 07/09/2020 3 68*    Hemoglobin 07/09/2020 12 0     Hematocrit 07/09/2020 35 1     MCV 07/09/2020 95     MCH 07/09/2020 32 6     MCHC 07/09/2020 34 2     RDW 07/09/2020 12 7     MPV 07/09/2020 11 3     Platelets 24/22/1743 164     nRBC 07/09/2020 0     Sodium 07/09/2020 131*    Potassium 07/09/2020 4 2     Chloride 07/09/2020 99*    CO2 07/09/2020 21     ANION GAP 07/09/2020 11  BUN 07/09/2020 12     Creatinine 07/09/2020 0 72     Glucose 07/09/2020 105     Calcium 07/09/2020 7 9*    AST 07/09/2020 25     ALT 07/09/2020 18     Alkaline Phosphatase 07/09/2020 188*    Total Protein 07/09/2020 5 7*    Albumin 07/09/2020 2 6*    Total Bilirubin 07/09/2020 0 29     eGFR 07/09/2020 118     Segmented % 07/09/2020 84*    Bands % 07/09/2020 1     Lymphocytes % 07/09/2020 9*    Monocytes % 07/09/2020 6     Eosinophils, % 07/09/2020 0     Basophils % 07/09/2020 0     Absolute Neutrophils 07/09/2020 11 61*    Lymphocytes Absolute 07/09/2020 1 23     Monocytes Absolute 07/09/2020 0 82     Eosinophils Absolute 07/09/2020 0 00     Basophils Absolute 07/09/2020 0 00     Total Counted 07/09/2020 100     RBC Morphology 07/09/2020 Normal     Platelet Estimate 10/06/9672 Adequate     pH, Cord Sandoval 07/09/2020 7  348     pCO2, Cord Sandoval 07/09/2020 41 1     pO2, Cord Sandoval 07/09/2020 22 9     HCO3, Cord Sandoval 07/09/2020 22 1    [facility] Exc, Cord Sandoval 07/09/2020 -3 3*    O2 Cont, Cord Sandoval 07/09/2020 13 0     O2 HGB,VENOUS CORD 07/09/2020 56 2     pH, Cord Art 07/09/2020 7 328     pCO2, Cord Art 07/09/2020 44 4     pO2, Cord Art 07/09/2020 17 7     HCO3, Cord Art 07/09/2020 22 8     Base Exc, Cord Art 07/09/2020 -3 3*    O2 Content, Cord Art 07/09/2020 9 2     O2 Hgb, Arterial Cord 07/09/2020 40 0     pH, Cord Sandoval 07/09/2020 7 339     pCO2, Cord Sandoval 07/09/2020 41 1     pO2, Cord Sandoval 07/09/2020 21 5     HCO3, Cord Sandoval 07/09/2020 21 6    [facility] Exc, Cord Sandoval 07/09/2020 -3 9*    O2 Cont, Cord Sandoval 07/09/2020 11 9     O2 HGB,VENOUS CORD 07/09/2020 52 4     pH, Cord Art 07/09/2020 7 306     pCO2, Cord Art 07/09/2020 38 3     pO2, Cord Art 07/09/2020 18 3     HCO3, Cord Art 07/09/2020 18 7     Base Exc, Cord Art 07/09/2020 -7 0*    O2 Content, Cord Art 07/09/2020 8 9     O2 Hgb, Arterial Cord 07/09/2020 39 5     WBC 07/10/2020 17 44*    RBC 07/10/2020 3 62*    Hemoglobin 07/10/2020 11 8     Hematocrit 07/10/2020 34 1*    MCV 07/10/2020 94     MCH 07/10/2020 32 6     MCHC 07/10/2020 34 6     RDW 07/10/2020 12 3     Platelets  164     MPV 07/10/2020 11 3     Sodium 07/10/2020 131*    Potassium 07/10/2020 4 9     Chloride 07/10/2020 100     CO2 07/10/2020 23     ANION GAP 07/10/2020 8     BUN 07/10/2020 16     Creatinine 07/10/2020 0 68     Glucose 07/10/2020 108     Calcium 07/10/2020 7 6*    AST 07/10/2020 32     ALT 07/10/2020 19     Alkaline Phosphatase 07/10/2020 147*    Total Protein 07/10/2020 4 6*    Albumin 07/10/2020 2 1*    Total Bilirubin 07/10/2020 0 23     eGFR 07/10/2020 123          Patient Active Problem List   Diagnosis    Depression    Abnormal cardiac valve    Gestational hypertension    Velamentous insertion of umbilical cord in second trimester    Encounter for ultrasound to check fetal growth    S/P  section    Monochorionic diamniotic twin gestation            Niall Dutta MD  2020  7:58 AM

## 2020-07-11 NOTE — PLAN OF CARE
Problem: PAIN - ADULT  Goal: Verbalizes/displays adequate comfort level or baseline comfort level  Description  Interventions:  - Encourage patient to monitor pain and request assistance  - Assess pain using appropriate pain scale  0-10  - Administer analgesics based on type and severity of pain and evaluate response  - Implement non-pharmacological measures as appropriate and evaluate response  - Consider cultural and social influences on pain and pain management  - Notify physician/advanced practitioner if interventions unsuccessful or patient reports new pain   Outcome: Progressing     Problem: INFECTION - ADULT  Goal: Absence or prevention of progression during hospitalization  Description  INTERVENTIONS:  - Assess and monitor for signs and symptoms of infection  - Monitor lab/diagnostic results  - Monitor all insertion sites, i e  indwelling lines  - [place] appropriate cooling/warming therapies per order  - Administer medications as ordered  - Instruct and encourage patient and family to use good hand hygiene technique   Outcome: Progressing  Goal: Absence of fever/infection during neutropenic period  Description  INTERVENTIONS:  - Monitor WBC    Outcome: Progressing     Problem: SAFETY ADULT  Goal: Patient will remain free of falls  Description  INTERVENTIONS:  - Assess patient frequently for physical needs  -  Identify cognitive and physical deficits and behaviors that affect risk of falls    -  [place] fall precautions as indicated by assessment   - Educate patient/family on patient safety including physical limitations  - Instruct patient to call for assistance with activity based on assessment  - Modify environment to reduce risk of injury   Outcome: Progressing  Goal: Maintain or return to baseline ADL function  Description  INTERVENTIONS:  -  Assess patient's ability to carry out ADLs; assess patient's baseline for ADL function and identify physical deficits which impact ability to perform ADLs (bathing, care of mouth/teeth, toileting, grooming, dressing, etc )  - Assess/evaluate cause of self-care deficits   - Assess range of motion  - Assess patient's mobility; develop plan if impaired  - Assess patient's need for assistive devices and provide as appropriate  - Encourage maximum independence but intervene and supervise when necessary  - Involve family in performance of ADLs  - Assess for home care needs following discharge  - Provide patient education as appropriate   Outcome: Progressing  Goal: Maintain or return mobility status to optimal level  Description  INTERVENTIONS:  - Assess patient's baseline mobility status (ambulation, transfers, stairs, etc )    - Identify cognitive and physical deficits and behaviors that affect mobility  - [place] fall precautions as indicated by assessment  - Instruct patient to call for assistance with activity based on assessment   Outcome: Progressing     Problem: Knowledge Deficit  Goal: Patient/family/caregiver demonstrates understanding of disease process, treatment plan, medications, and discharge instructions  Description  Complete learning assessment and assess knowledge base  Interventions:  - Provide teaching at level of understanding  - Provide teaching via preferred learning methods   Outcome: Progressing  Goal: Verbalizes understanding of labor plan  Description  Assess patient/family/caregiver's baseline knowledge level and ability to understand information  Provide education via patient/family/caregiver's preferred learning method at appropriate level of understanding  1  Provide teaching at level of understanding  2  Provide teaching via preferred learning method(s)     Outcome: Progressing     Problem: DISCHARGE PLANNING  Goal: Discharge to home or other facility with appropriate resources  Description  INTERVENTIONS:  - Identify barriers to discharge w/patient and caregiver  - Arrange for needed discharge resources and transportation as appropriate  - Identify discharge learning needs (meds, wound care, etc )  - Arrange for interpretive services to assist at discharge as needed  - Refer to Case Management Department for coordinating discharge planning if the patient needs post-hospital services based on physician/advanced practitioner order or complex needs related to functional status, cognitive ability, or social support system  Outcome: Progressing     Problem: POSTPARTUM  Goal: Experiences normal postpartum course  Description  INTERVENTIONS:  - Monitor maternal vital signs  - Assess uterine involution and lochia  Outcome: Progressing  Goal: Appropriate maternal -  bonding  Description  INTERVENTIONS:  - Identify family support  - Assess for appropriate maternal/infant bonding   -Encourage maternal/infant bonding opportunities  - Referral to  or  as needed  Outcome: Progressing  Goal: Establishment of infant feeding pattern  Description  INTERVENTIONS:  - Assess breast pumping q2-3 hrs  - Refer to lactation as needed   Outcome: Progressing  Goal: Incision(s), wounds(s) or drain site(s) healing without S/S of infection  Description  INTERVENTIONS  - Assess and document risk factors for skin impairment   - Assess and document incision and surrounding tissue  - Consider nutrition services referral as needed  - Oral mucous membranes remain intact  - Provide patient/ family education   Outcome: Progressing

## 2020-07-12 VITALS
HEIGHT: 63 IN | RESPIRATION RATE: 18 BRPM | SYSTOLIC BLOOD PRESSURE: 144 MMHG | WEIGHT: 178 LBS | TEMPERATURE: 98.1 F | BODY MASS INDEX: 31.54 KG/M2 | OXYGEN SATURATION: 98 % | HEART RATE: 54 BPM | DIASTOLIC BLOOD PRESSURE: 89 MMHG

## 2020-07-12 PROBLEM — Z36.89 ENCOUNTER FOR ULTRASOUND TO CHECK FETAL GROWTH: Status: RESOLVED | Noted: 2020-05-26 | Resolved: 2020-07-12

## 2020-07-12 PROCEDURE — 90715 TDAP VACCINE 7 YRS/> IM: CPT | Performed by: OBSTETRICS & GYNECOLOGY

## 2020-07-12 PROCEDURE — 99024 POSTOP FOLLOW-UP VISIT: CPT | Performed by: OBSTETRICS & GYNECOLOGY

## 2020-07-12 RX ORDER — OXYCODONE HYDROCHLORIDE 5 MG/1
5 TABLET ORAL EVERY 4 HOURS PRN
Qty: 10 TABLET | Refills: 0 | Status: CANCELLED | OUTPATIENT
Start: 2020-07-12 | End: 2020-07-22

## 2020-07-12 RX ORDER — DOCUSATE SODIUM 100 MG/1
100 CAPSULE, LIQUID FILLED ORAL 2 TIMES DAILY
Qty: 10 CAPSULE | Refills: 0
Start: 2020-07-12 | End: 2020-08-06 | Stop reason: ALTCHOICE

## 2020-07-12 RX ORDER — ACETAMINOPHEN 325 MG/1
650 TABLET ORAL EVERY 4 HOURS PRN
Qty: 30 TABLET | Refills: 0
Start: 2020-07-12 | End: 2020-08-06 | Stop reason: ALTCHOICE

## 2020-07-12 RX ORDER — IBUPROFEN 600 MG/1
600 TABLET ORAL EVERY 6 HOURS PRN
Qty: 30 TABLET | Refills: 0
Start: 2020-07-12 | End: 2020-08-06 | Stop reason: ALTCHOICE

## 2020-07-12 RX ADMIN — TETANUS TOXOID, REDUCED DIPHTHERIA TOXOID AND ACELLULAR PERTUSSIS VACCINE, ADSORBED 0.5 ML: 5; 2.5; 8; 8; 2.5 SUSPENSION INTRAMUSCULAR at 08:28

## 2020-07-12 RX ADMIN — DOCUSATE SODIUM 100 MG: 100 CAPSULE, LIQUID FILLED ORAL at 08:27

## 2020-07-12 RX ADMIN — IBUPROFEN 600 MG: 600 TABLET ORAL at 08:27

## 2020-07-12 NOTE — PLAN OF CARE
Problem: PAIN - ADULT  Goal: Verbalizes/displays adequate comfort level or baseline comfort level  Description  Interventions:  - Encourage patient to monitor pain and request assistance  - Assess pain using appropriate pain scale  0-10  - Administer analgesics based on type and severity of pain and evaluate response  - Implement non-pharmacological measures as appropriate and evaluate response  - Consider cultural and social influences on pain and pain management  - Notify physician/advanced practitioner if interventions unsuccessful or patient reports new pain   Outcome: Progressing     Problem: INFECTION - ADULT  Goal: Absence or prevention of progression during hospitalization  Description  INTERVENTIONS:  - Assess and monitor for signs and symptoms of infection  - Monitor lab/diagnostic results  - Monitor all insertion sites, i e  indwelling lines  - [place] appropriate cooling/warming therapies per order  - Administer medications as ordered  - Instruct and encourage patient and family to use good hand hygiene technique   Outcome: Progressing  Goal: Absence of fever/infection during neutropenic period  Description  INTERVENTIONS:  - Monitor WBC    Outcome: Progressing     Problem: SAFETY ADULT  Goal: Patient will remain free of falls  Description  INTERVENTIONS:  - Assess patient frequently for physical needs  -  Identify cognitive and physical deficits and behaviors that affect risk of falls    -  [place] fall precautions as indicated by assessment   - Educate patient/family on patient safety including physical limitations  - Instruct patient to call for assistance with activity based on assessment  - Modify environment to reduce risk of injury   Outcome: Progressing  Goal: Maintain or return to baseline ADL function  Description  INTERVENTIONS:  -  Assess patient's ability to carry out ADLs; assess patient's baseline for ADL function and identify physical deficits which impact ability to perform ADLs (bathing, care of mouth/teeth, toileting, grooming, dressing, etc )  - Assess/evaluate cause of self-care deficits   - Assess range of motion  - Assess patient's mobility; develop plan if impaired  - Assess patient's need for assistive devices and provide as appropriate  - Encourage maximum independence but intervene and supervise when necessary  - Involve family in performance of ADLs  - Assess for home care needs following discharge  - Provide patient education as appropriate   Outcome: Progressing  Goal: Maintain or return mobility status to optimal level  Description  INTERVENTIONS:  - Assess patient's baseline mobility status (ambulation, transfers, stairs, etc )    - Identify cognitive and physical deficits and behaviors that affect mobility  - [place] fall precautions as indicated by assessment  - Instruct patient to call for assistance with activity based on assessment   Outcome: Progressing     Problem: Knowledge Deficit  Goal: Patient/family/caregiver demonstrates understanding of disease process, treatment plan, medications, and discharge instructions  Description  Complete learning assessment and assess knowledge base  Interventions:  - Provide teaching at level of understanding  - Provide teaching via preferred learning methods   Outcome: Progressing  Goal: Verbalizes understanding of labor plan  Description  Assess patient/family/caregiver's baseline knowledge level and ability to understand information  Provide education via patient/family/caregiver's preferred learning method at appropriate level of understanding  1  Provide teaching at level of understanding  2  Provide teaching via preferred learning method(s)     Outcome: Progressing     Problem: DISCHARGE PLANNING  Goal: Discharge to home or other facility with appropriate resources  Description  INTERVENTIONS:  - Identify barriers to discharge w/patient and caregiver  - Arrange for needed discharge resources and transportation as appropriate  - Identify discharge learning needs (meds, wound care, etc )  - Arrange for interpretive services to assist at discharge as needed  - Refer to Case Management Department for coordinating discharge planning if the patient needs post-hospital services based on physician/advanced practitioner order or complex needs related to functional status, cognitive ability, or social support system  Outcome: Progressing     Problem: POSTPARTUM  Goal: Experiences normal postpartum course  Description  INTERVENTIONS:  - Monitor maternal vital signs  - Assess uterine involution and lochia  Outcome: Progressing  Goal: Appropriate maternal -  bonding  Description  INTERVENTIONS:  - Identify family support  - Assess for appropriate maternal/infant bonding   -Encourage maternal/infant bonding opportunities  - Referral to  or  as needed  Outcome: Progressing  Goal: Establishment of infant feeding pattern  Description  INTERVENTIONS:  - Assess breast pumping q2-3 hrs  - Refer to lactation as needed   Outcome: Progressing  Goal: Incision(s), wounds(s) or drain site(s) healing without S/S of infection  Description  INTERVENTIONS  - Assess and document risk factors for skin impairment   - Assess and document incision and surrounding tissue  - Consider nutrition services referral as needed  - Oral mucous membranes remain intact  - Provide patient/ family education   Outcome: Progressing

## 2020-07-12 NOTE — PROGRESS NOTES
Progress Note - OB/GYN  Arianna Medeiros 26 y o  female MRN: [id number]  Unit/Bed#: -01 Encounter: [id number]      A/P: POD # 3 s/p 1LTCS , babies in NICU  1) Post operative   - QBL 503cc, Hgb 12 0 --> 11 8   - Voiding spontaneously  2) Pre-E w/o SF   - 120-140/60-80   - Patient asymptomatic   3) Labial swelling   - Much improved  4) Continue routine post-op care   - Encourage ambulation   - Encourage breastfeeding   - Anticipate discharge on 7/12/2020         ______________      Subjective:  Patient feeling well this morning  She has no complaints at this time   She is requesting discharge home today    Pain: well controlled  Tolerating PO: yes  Voiding: yes  Flatus: yes  BM: no  Ambulating: yes  Breastfeeding:  yes  Chest pain: no  Shortness of breath: no  Leg pain: no  Lochia: minimal    Vitals:   /89 Comment: RN Farheen Aware  Pulse (!) 54   Temp 98 1 °F (36 7 °C) (Oral)   Resp 18   Ht 5' 3" (1 6 m)   Wt 80 7 kg (178 lb)   LMP 11/18/2019   SpO2 98%   Breastfeeding Yes   BMI 31 53 kg/m²     No intake or output data in the 24 hours ending 07/12/20 0901    Invasive Devices     None                 Physical Exam:   GEN: Arianna Medeiros appears well, alert and oriented x 3, pleasant and cooperative   CARDIO: RRR, no murmurs or rubs  RESP:  CTAB, no wheezes or rales  ABDOMEN: soft, no tenderness, no distention, fundus @ -2, Incision C/D/I  EXTREMITIES: SCDs on, Negative Audra's sign bilaterally      Labs:   Admission on 07/08/2020   Component Date Value    WBC 07/08/2020 8 46     RBC 07/08/2020 3 68*    Hemoglobin 07/08/2020 12 1     Hematocrit 07/08/2020 34 7*    MCV 07/08/2020 94     MCH 07/08/2020 32 9     MCHC 07/08/2020 34 9     RDW 07/08/2020 12 5     Platelets 29/52/4806 146*    MPV 07/08/2020 11 5     Sodium 07/08/2020 135*    Potassium 07/08/2020 3 9     Chloride 07/08/2020 102     CO2 07/08/2020 21     ANION GAP 07/08/2020 12     BUN 07/08/2020 8     Creatinine 07/08/2020 0 60     Glucose 07/08/2020 70     Calcium 07/08/2020 8 3     AST 07/08/2020 23     ALT 07/08/2020 17     Alkaline Phosphatase 07/08/2020 182*    Total Protein 07/08/2020 5 5*    Albumin 07/08/2020 2 7*    Total Bilirubin 07/08/2020 0 27     eGFR 07/08/2020 128     Clarity, UA 07/08/2020 Clear     Color, UA 07/08/2020 Yellow     Specific Gravity, UA 07/08/2020 1 020     pH, UA 07/08/2020 6 0     Glucose, UA 07/08/2020 Negative     Ketones, UA 07/08/2020 Negative     Blood, UA 07/08/2020 Negative     Protein, UA 07/08/2020 30 (1+)*    Nitrite, UA 07/08/2020 Negative     Bilirubin, UA 07/08/2020 Negative     Urobilinogen, UA 07/08/2020 0 2     Leukocytes, UA 07/08/2020 Negative     WBC, UA 07/08/2020 0-1*    RBC, UA 07/08/2020 0-1*    Bacteria, UA 07/08/2020 Occasional     Epithelial Cells 07/08/2020 Occasional     Creatinine, Ur 07/08/2020 63 0     Protein Urine Random 07/08/2020 72     Prot/Creat Ratio, Ur 07/08/2020 1 14*    Urine Culture 07/08/2020 No Growth <1000 cfu/mL     ABO Grouping 07/09/2020 O     Rh Factor 07/09/2020 Positive     Antibody Screen 07/09/2020 Negative     Specimen Expiration Date 07/09/2020 [id number]     Strep Grp B PCR 07/09/2020 Negative for Beta Hemolytic Strep Grp B by PCR     Creatinine, Ur 07/09/2020 364 0     Protein Urine Random 07/09/2020 171     Prot/Creat Ratio, Ur 07/09/2020 0 47*    WBC 07/09/2020 13 66*    RBC 07/09/2020 3 68*    Hemoglobin 07/09/2020 12 0     Hematocrit 07/09/2020 35 1     MCV 07/09/2020 95     MCH 07/09/2020 32 6     MCHC 07/09/2020 34 2     RDW 07/09/2020 12 7     MPV 07/09/2020 11 3     Platelets 75/93/1289 164     nRBC 07/09/2020 0     Sodium 07/09/2020 131*    Potassium 07/09/2020 4 2     Chloride 07/09/2020 99*    CO2 07/09/2020 21     ANION GAP 07/09/2020 11     BUN 07/09/2020 12     Creatinine 07/09/2020 0 72     Glucose 07/09/2020 105     Calcium 07/09/2020 7 9*    AST 07/09/2020 25     ALT 07/09/2020 18     Alkaline Phosphatase 07/09/2020 188*    Total Protein 07/09/2020 5 7*    Albumin 07/09/2020 2 6*    Total Bilirubin 07/09/2020 0 29     eGFR 07/09/2020 118     Segmented % 07/09/2020 84*    Bands % 07/09/2020 1     Lymphocytes % 07/09/2020 9*    Monocytes % 07/09/2020 6     Eosinophils, % 07/09/2020 0     Basophils % 07/09/2020 0     Absolute Neutrophils 07/09/2020 11 61*    Lymphocytes Absolute 07/09/2020 1 23     Monocytes Absolute 07/09/2020 0 82     Eosinophils Absolute 07/09/2020 0 00     Basophils Absolute 07/09/2020 0 00     Total Counted 07/09/2020 100     RBC Morphology 07/09/2020 Normal     Platelet Estimate 87/39/8938 Adequate     pH, Cord Sandoval 07/09/2020 7  348     pCO2, Cord Sandoval 07/09/2020 41 1     pO2, Cord Sandoval 07/09/2020 22 9     HCO3, Cord Sandoval 07/09/2020 22 1    [facility] Exc, Cord Sandoval 07/09/2020 -3 3*    O2 Cont, Cord Sandoval 07/09/2020 13 0     O2 HGB,VENOUS CORD 07/09/2020 56 2     pH, Cord Art 07/09/2020 7 328     pCO2, Cord Art 07/09/2020 44 4     pO2, Cord Art 07/09/2020 17 7     HCO3, Cord Art 07/09/2020 22 8     Base Exc, Cord Art 07/09/2020 -3 3*    O2 Content, Cord Art 07/09/2020 9 2     O2 Hgb, Arterial Cord 07/09/2020 40 0     pH, Cord Sandoval 07/09/2020 7 339     pCO2, Cord Sandoval 07/09/2020 41 1     pO2, Cord Sandoval 07/09/2020 21 5     HCO3, Cord Sandoval 07/09/2020 21 6     Base Exc, Cord Sandoval 07/09/2020 -3 9*    O2 Cont, Cord Sandoval 07/09/2020 11 9     O2 HGB,VENOUS CORD 07/09/2020 52 4     pH, Cord Art 07/09/2020 7 306     pCO2, Cord Art 07/09/2020 38 3     pO2, Cord Art 07/09/2020 18 3     HCO3, Cord Art 07/09/2020 18 7     Base Exc, Cord Art 07/09/2020 -7 0*    O2 Content, Cord Art 07/09/2020 8 9     O2 Hgb, Arterial Cord 07/09/2020 39 5     WBC 07/10/2020 17 44*    RBC 07/10/2020 3 62*    Hemoglobin 07/10/2020 11 8     Hematocrit 07/10/2020 34 1*    MCV 07/10/2020 94     MCH 07/10/2020 32 6     MCHC 07/10/2020 34 6     RDW 07/10/2020 12 3     Platelets  164     MPV 07/10/2020 11 3     Sodium 07/10/2020 131*    Potassium 07/10/2020 4 9     Chloride 07/10/2020 100     CO2 07/10/2020 23     ANION GAP 07/10/2020 8     BUN 07/10/2020 16     Creatinine 07/10/2020 0 68     Glucose 07/10/2020 108     Calcium 07/10/2020 7 6*    AST 07/10/2020 32     ALT 07/10/2020 19     Alkaline Phosphatase 07/10/2020 147*    Total Protein 07/10/2020 4 6*    Albumin 07/10/2020 2 1*    Total Bilirubin 07/10/2020 0 23     eGFR 07/10/2020 123          Patient Active Problem List   Diagnosis    Depression    Abnormal cardiac valve    Gestational hypertension    Velamentous insertion of umbilical cord in second trimester    Encounter for ultrasound to check fetal growth    S/P  section    Monochorionic diamniotic twin gestation            Prosper Daiman MD  2020  9:01 AM

## 2020-07-13 NOTE — UTILIZATION REVIEW
Continued Stay Review    OBS 07-08-20 @ 1731 CONVERTED TO INPATIENT ADMISSION 07-09-20 @ 0933 FOR CONTINUATION FOR CARE  OF MONO/DI TWINS WITH 22 % DISCORDANCE GROWTH IN TWIN B AND POSSIBLE PTL    Inpatient Admission Once     Transfer Service: OB/GYN       Question Answer Comment   Admitting Physician Destiny Garcia of Care Med Surg    Estimated length of stay More than 2 Midnights    Certification I certify that inpatient services are medically necessary for this patient for a duration of greater than two midnights  See H&P and MD Progress Notes for additional information about the patient's course of treatment          07/09/20 0933         Date:  07-09-20                         Current Patient Class: INPATIENT  Current Level of Care: MEDICAL     HPI:25 y o  female initially admitted on  07-08-20 as observation status and converted to inpatient 07-09-20  For continuation of care for discordance growth of twins B and PTL @ 33 3/7 wks and possible pre-eclampsia    Assessment/Plan:   BP's somewhat under control  Urine p/c 1 14 s/p 1 dose BTM  2nd one today   (+) contraction  With cervical change  2/70/-1  To 3/70/ -1  Plan continue to externally monitor if more cervical changes will start mgso 4  For neuro protection  Plan induction of labor @ 34 wks  Or sooner if indicated   Pertinent Labs/Diagnostic Results:       Results from last 7 days   Lab Units 07/10/20  0524 07/09/20  1540 07/08/20  1621   WBC Thousand/uL 17 44* 13 66* 8 46   HEMOGLOBIN g/dL 11 8 12 0 12 1   HEMATOCRIT % 34 1* 35 1 34 7*   PLATELETS Thousands/uL 164 164 146*   BANDS PCT %  --  1  --          Results from last 7 days   Lab Units 07/10/20  0524 07/09/20  1540 07/08/20  1621   SODIUM mmol/L 131* 131* 135*   POTASSIUM mmol/L 4 9 4 2 3 9   CHLORIDE mmol/L 100 99* 102   CO2 mmol/L 23 21 21   ANION GAP mmol/L 8 11 12   BUN mg/dL 16 12 8   CREATININE mg/dL 0 68 0 72 0 60   EGFR ml/min/1 73sq m 123 118 128   CALCIUM mg/dL 7 6* 7 9* 8 3 Results from last 7 days   Lab Units 07/10/20  0524 07/09/20  1540 07/08/20  1621   AST U/L 32 25 23   ALT U/L 19 18 17   ALK PHOS U/L 147* 188* 182*   TOTAL PROTEIN g/dL 4 6* 5 7* 5 5*   ALBUMIN g/dL 2 1* 2 6* 2 7*   TOTAL BILIRUBIN mg/dL 0 23 0 29 0 27         Results from last 7 days   Lab Units 07/10/20  0524 07/09/20  1540 07/08/20  1621   GLUCOSE RANDOM mg/dL 108 105 70       Results from last 7 days   Lab Units 07/09/20  1534 07/08/20  1622   CLARITY UA   --  Clear   COLOR UA   --  Yellow   SPEC GRAV UA   --  1 020   PH UA   --  6 0   GLUCOSE UA mg/dl  --  Negative   KETONES UA mg/dl  --  Negative   BLOOD UA   --  Negative   PROTEIN UA mg/dl  --  30 (1+)*   NITRITE UA   --  Negative   BILIRUBIN UA   --  Negative   UROBILINOGEN UA E U /dl  --  0 2   LEUKOCYTES UA   --  Negative   WBC UA /hpf  --  0-1*   RBC UA /hpf  --  0-1*   BACTERIA UA /hpf  --  Occasional   EPITHELIAL CELLS WET PREP /hpf  --  Occasional   CREATININE UR mg/dL 364 0 63 0   PROTEIN UR mg/dL 171 72   PROT/CREAT RATIO UR  0 47* 1 14*     Vital Signs:   Date/Time  Temp  Pulse  Resp  BP  SpO2  Cardiac (WDL)  Patient Position - Orthostatic VS   07/09/20 0942  --  --  --  --  --  --  --   Comment rows:   OBSERV: Dr Andrea Boyle at bedside, ultrasound performed at 07/09/20 5915   07/09/20 0937  --  --  --  --  --  --  --   Comment rows:   OBSERV: Dr Nicky Syed at bedside, ultrasound performed at 07/09/20 2132   07/09/20 0925  --  --  --  --  --  --  --   Comment rows:   OBSERV: transfered to L/D for induction  at 07/09/20 0925   07/09/20 0844  98 3 °F (36 8 °C)  87  20  141/73  98 %  --  --   07/09/20 0643  --  --  --  --  --  --  --   Comment rows:   OBSERV: Dr  Morrie Led at patient bedside  at 07/09/20 0643   07/09/20 0435  98 2 °F (36 8 °C)  83  18  139/72  --  --  --   07/09/20 0126  --  --  --  --  --  --  --   Comment rows:   OBSERV: Dr Crystal Holden at patient bedside   at 07/09/20 0126 07/09/20 0102  --  --  --  --  --  --  --   Comment rows: OBSERV: Dr Demar Lynn at patient bedside  at 07/09/20 0102   07/09/20 0026  98 4 °F (36 9 °C)  72  16  137/78  --  --  --   07/09/20 0011  --  --  --  --  --  --  --   Comment rows:   OBSERV: RN spoke with Dr Demar Lynn about contraction pattern, patient has no c/o contraction discomfort, patient visibly comfortable and in no apparent distress, MD to RN okay to disconfinue fetal monitoring  at 07/09/20 0011   07/08/20 2043  99 2 °F (37 3 °C)  73  16  135/64  98 %  --  Sitting   07/08/20 2015  --  --  --  --  --  WDL  --   07/08/20 1942  --  88  --  136/63  --  --  --   07/08/20 1927  --  89  --  139/68  --  --  --   07/08/20 1912  --  81  --  135/66  --  --  --   07/08/20 1900  --  85  --  139/68  --  --  --   07/08/20 1842  --  85  --  135/63  --  --  --   07/08/20 1828  --  71  --  143/66  --  --  --   07/08/20 1815  --  81  --  136/62  --  --  --   07/08/20 1800  --  88  --  135/66  --  --  --   07/08/20 1743  --  82  --  134/60  --  --  --   07/08/20 1728  --  78  --  137/62               Medications:   Scheduled Medications:    No current facility-administered medications for this encounter  Continuous IV Infusions:    No current facility-administered medications for this encounter  PRN Meds:    No current facility-administered medications for this encounter  Discharge Plan:home after delivery    Network Utilization Review Department  [email or web address]  ATTENTION: Please call with any questions or concerns to [phone] and carefully listen to the prompts so that you are directed to the right person  All voicemails are confidential   Farrel Bodily all requests for admission clinical reviews, approved or denied determinations and any other requests to dedicated fax number below belonging to the campus where the patient is receiving treatment   List of dedicated fax numbers for the Facilities:  FACILITY NAME DALE Ferguson 25 DENIALS (Administrative/Medical Necessity) [phone] [address] (Maternity/NICU/Pediatrics) Jordengeetha [phone]   Marlee Horan [phone]   Frankie Zaldivar [phone]   [address] [phone]   [facility]  [phone]   [address] [facility], [address]  [address] [facility] [address] [facility] [phone]

## 2020-07-13 NOTE — UTILIZATION REVIEW
Notification of Maternity/Delivery & [place] Birth Information for Admission - Your request has been successfully submitted and the reference number is [id number]     Notification of Maternity/Delivery for Admission to our facility [address]  Be advised that this patient was admitted to our facility under Inpatient Status  Contact Laura Miranda at [phone] for additional admission information  [facility] PARENT/CHILD HEALTH  DEPT DEDICATED Catalina  [phone]  Mother & Caribou Information   Patient Name: Deonte Brar   YOB: 1995   Delivering clinician:      Augustchica Barraza Girl 1 [facility]) [[id number]]   [facility]     Sarah Hartmann, Baby Girl 2 [facility]) [[id number]]   [facility]    OB History        2    Para   2    Term   1       1    AB   0    Living   3       SAB   0    TAB   0    Ectopic   0    Multiple   1    Live Births   3               Caribou Name & MRN:   Information for the patient's :  Robinson Barraza Girl 1 [facility]) [[id number]]     [place] Delivery Information:  Sex: female  Delivered 2020 7:33 PM by , Low Transverse; Gestational Age: 27w4d     Measurements:  Weight: 4 lb 6 2 oz (1990 g); Height: 16 14"    APGAR 1 minute 5 minutes 10 minutes   Totals: 8 9      Information for the patient's :  Robinson Barraza Girl 2 [facility]) [[id number]]     [place] Delivery Information:  Sex: female  Delivered 2020 7:34 PM by , Low Transverse; Gestational Age: 27w4d    [place]ou Measurements:  Weight: 5 lb 0 1 oz (2270 g); Height: 19"    APGAR 1 minute 5 minutes 10 minutes   Totals: 8 9       Birth Information: 26 y o  female MRN: [id number] Unit/Bed#: -01 Estimated Date of Delivery: 20  Birthweight: No birth weight on file   Gestational Age: <None> Delivery Type:      Robinson Barraza Girl 1 [facility]) [[id number]]   , Low Transverse Robinson Barraza Girl 2 [facility]) [[id number]]   , Low Transverse       Authorization Information   Servicing Facility:   [facility]  Tax ID: [id number]  NPI: [id number] Attending Provider/NPI:  Phone:  Address: Attending Physician:  NORMA Viveros  Specialty- Obstetrics and Gynecology  [facility] ID- [id number]  [address] [place] [address], [address] [place] [address]  Phone 1: [phone]  Fax: [phone]  Same as PORSCHE/Dorian Sexton 1106 of Service Code:  24 Place of Service Name: [address] [place] [address]   Start Date: 20 4656   Discharge Date & Time: 2020  1:47 PM    Type of Admission: Inpatient Status Discharge Disposition (if discharged): Home/Self Care   Patient Diagnoses:   33 weeks gestation of pregnancy [Z3A 33]  The primary encounter diagnosis was S/P  section  Diagnoses of 33 weeks gestation of pregnancy, Monochorionic diamniotic twin gestation in third trimester, Unstable fetal lie, fetus 2 of multiple gestation, and Hx of pre-term labor were also pertinent to this visit  1  S/P  section    2  33 weeks gestation of pregnancy    3  Monochorionic diamniotic twin gestation in third trimester    4  Unstable fetal lie, fetus 2 of multiple gestation    5  Hx of pre-term labor       Orders: Admission Orders (From admission, onward)     Ordered        20 0933  Inpatient Admission  Once         20 1731  Place in Observation  Once                    Assigned Utilization Review Contact: Laura Miranda  Utilization   Network Utilization Review Department  Phone: [phone]; Fax [phone]  Email: [email or web address]

## 2020-07-13 NOTE — UTILIZATION REVIEW
Initial Clinical Review    Admission: Date/Time/Statement: Admission Orders (From admission, onward)     Ordered        07/08/20 1731  Place in Observation  Once                   Orders Placed This Encounter   Procedures    Place in Observation     Standing Status:   Standing     Number of Occurrences:   1     Order Specific Question:   Admitting Physician     Answer:   Stacy Alford [[id number]]     Order Specific Question:   Level of Care     Answer:   Med Surg [[id number]]     Chief Complaint   Patient presents with    Hypertension     Assessment/Plan:  27 yo  G 2 P 1 @ 33 2/7 wks gestation with mono/di twins presented to [facility] triage with increased BP's with discordance growth of twin B 22%  Velamentous insertion of umbilical cord notes  Patient (+) 2 LE edema  Plan continuous external monitoring BTM  Patient (+) contractions mild-moderate SVE 07-09-20 ( 3/70/-1 )    SVE:  Dilation: 1-2  Effacement (%): 70  Station: -1  Cervical Characteristics: Posterior, Soft     FHT:  Baseline Rate: 135 bpm  Variability: Moderate 6-25 bpm  Accelerations: 15 x 15 or greater  FHR Category: Category I     TOCO:   Contraction Frequency (minutes): 1 5-3  Contraction Duration (seconds): 60  Contraction Quality: Mild     OB  Triage Vitals   Temperature Pulse Respirations Blood Pressure SpO2   07/08/20 1555 07/08/20 1550 07/08/20 1555 07/08/20 1550 07/08/20 2043   99 °F (37 2 °C) 84 20 138/77 98 %      Temp Source Heart Rate Source Patient Position - Orthostatic VS BP Location FiO2 (%)   07/08/20 1555 07/08/20 1550 07/08/20 2043 07/08/20 1550 --   Oral Monitor Sitting Right arm       Pain Score       07/08/20 1559       No Pain        Wt Readings from Last 1 Encounters:   07/08/20 80 7 kg (178 lb)     Additional Vital Signs:   07/09/20 0435  98 2 °F (36 8 °C)  83  18  139/72  --  --  --   07/09/20 0126  --  --  --  --  --  --  --   Comment rows:   OBSERV: Dr Monika No at patient bedside   at 07/09/20 0126   07/09/20 0102  --  --  --  --  --  --  -- Comment rows:   OBSERV: Dr Catalina Benson at patient bedside  at 07/09/20 0102   07/09/20 0026  98 4 °F (36 9 °C)  72  16  137/78  --  --  --   07/09/20 0011  --  --  --  --  --  --  --   Comment rows:   OBSERV: RN spoke with Dr Catalina Benson about contraction pattern, patient has no c/o contraction discomfort, patient visibly comfortable and in no apparent distress, MD to RN okay to disconfinue fetal monitoring   at 07/09/20 0011 07/08/20 2043  99 2 °F (37 3 °C)  73  16  135/64  98 %  --  Sitting   07/08/20 2015  --  --  --  --  --  WDL  --   07/08/20 1942  --  88  --  136/63  --  --  --   07/08/20 1927  --  89  --  139/68  --  --  --   07/08/20 1912  --  81  --  135/66  --  --  --   07/08/20 1900  --  85  --  139/68  --  --  --   07/08/20 1842  --  85  --  135/63  --  --  --   07/08/20 1828  --  71  --  143/66  --  --  --   07/08/20 1815  --  81  --  136/62  --  --  --   07/08/20 1800  --  88  --  135/66  --  --  --   07/08/20 1743  --  82  --  134/60  --  --  --   07/08/20 1728  --  78  --  137/62  --  --  --   07/08/20 1713  --  73  --  136/61  --  --  --   07/08/20 1658  --  80  --  132/72  --  --  --   07/08/20 1642  --  101  --  146/72  --  --  --   07/08/20 1627  --  86  --  135/66  --  --  --   07/08/20 1605  --  74  --  125/64             Pertinent Labs/Diagnostic Test Results:       Results from last 7 days   Lab Units 07/08/20  1621   WBC Thousand/uL 8 46   HEMOGLOBIN g/dL 12 1   HEMATOCRIT % 34 7*   PLATELETS Thousands/uL 146*         Results from last 7 days   Lab Units 07/08/20  1621   SODIUM mmol/L 135*   POTASSIUM mmol/L 3 9   CHLORIDE mmol/L 102   CO2 mmol/L 21   ANION GAP mmol/L 12   BUN mg/dL 8   CREATININE mg/dL 0 60   EGFR ml/min/1 73sq m 128   CALCIUM mg/dL 8 3     Results from last 7 days   Lab Units 07/08/20  1621   AST U/L 23   ALT U/L 17   ALK PHOS U/L 182*   TOTAL PROTEIN g/dL 5 5*   ALBUMIN g/dL 2 7*   TOTAL BILIRUBIN mg/dL 0 27         Results from last 7 days   Lab Units 07/08/20  1621   GLUCOSE RANDOM mg/dL 70     Results from last 7 days   Lab Units 07/08/20  1622   CLARITY UA  Clear   COLOR UA  Yellow   SPEC GRAV UA  1 020   PH UA  6 0   GLUCOSE UA mg/dl Negative   KETONES UA mg/dl Negative   BLOOD UA  Negative   PROTEIN UA mg/dl 30 (1+)*   NITRITE UA  Negative   BILIRUBIN UA  Negative   UROBILINOGEN UA E U /dl 0 2   LEUKOCYTES UA  Negative   WBC UA /hpf 0-1*   RBC UA /hpf 0-1*   BACTERIA UA /hpf Occasional   EPITHELIAL CELLS WET PREP /hpf Occasional   CREATININE UR mg/dL 63 0   PROTEIN UR mg/dL 72   PROT/CREAT RATIO UR  1 14*         Past Medical History:   Diagnosis Date    Carpal tunnel syndrome     with pregnancy    Depression 11/2/2017    POTS (postural orthostatic tachycardia syndrome)     Varicella      Present on Admission:  Dotty Moreno Monochorionic diamniotic twin gestation      Admitting Diagnosis: 33 weeks gestation of pregnancy [Z3A 33]  Age/Sex: 26 y o  female  Admission Orders:  Scheduled Medications:    Medications:  betamethasone acetate-betamethasone sodium phosphate 12 mg Intramuscular Q24H   vancomycin 20 mg/kg Intravenous Q8H     Continuous IV Infusions:    lactated ringers 75 mL/hr Intravenous Continuous     PRN Meds:       None    Network Utilization Review Department  [email or web address]  ATTENTION: Please call with any questions or concerns to [phone] and carefully listen to the prompts so that you are directed to the right person  All voicemails are confidential   Eleni Ro all requests for admission clinical reviews, approved or denied determinations and any other requests to dedicated fax number below belonging to the campus where the patient is receiving treatment   List of dedicated fax numbers for the Facilities:  [address] DENIALS (Administrative/Medical Necessity) [phone]   [address] (Maternity/NICU/Pediatrics) [facility] [address] [phone] Ayala Form [phone]   Grace Waters [facility] [address] [facility] [phone]   [facility]  [phone]   [address] [facility]  [phone]   [address] [place] [address] 1000 [facility] [phone]

## 2020-07-16 ENCOUNTER — ROUTINE PRENATAL (OUTPATIENT)
Dept: OBGYN CLINIC | Facility: CLINIC | Age: 25
End: 2020-07-16

## 2020-07-16 VITALS
WEIGHT: 137 LBS | TEMPERATURE: 95 F | DIASTOLIC BLOOD PRESSURE: 78 MMHG | HEIGHT: 63 IN | SYSTOLIC BLOOD PRESSURE: 118 MMHG | BODY MASS INDEX: 24.27 KG/M2

## 2020-07-16 DIAGNOSIS — Z98.891 S/P CESAREAN SECTION: Primary | ICD-10-CM

## 2020-07-16 PROCEDURE — 3008F BODY MASS INDEX DOCD: CPT | Performed by: OBSTETRICS & GYNECOLOGY

## 2020-07-16 PROCEDURE — 1111F DSCHRG MED/CURRENT MED MERGE: CPT | Performed by: OBSTETRICS & GYNECOLOGY

## 2020-07-16 PROCEDURE — 99024 POSTOP FOLLOW-UP VISIT: CPT | Performed by: OBSTETRICS & GYNECOLOGY

## 2020-07-16 NOTE — PROGRESS NOTES
Tyson Worthyan is a 26 y o   about a week and a half post  section for twins  Patient was in  labor twins are in NICU improving  The delivery was at 33 gestational weeks  Outcome: PCS  Anesthesia: spinal   Postpartum course has been uncomplicated  Twins course has been doing well in NICU  Babies are feeding pumped breast milk  Patient is tolerating regular diet, Bleeding moderate lochia  Bowel function is normal  Bladder function is normal  Patient is not sexually active  Postpartum depression screening: negative EPDS 1    Physical:   Vitals:    20 1133   BP: 118/78   Temp: (!) 95 °F (35 °C)              Obstetric History  OB History    Para Term  AB Living   2 2 1 1 0 3   SAB TAB Ectopic Multiple Live Births   0 0 0 1 3      # Outcome Date GA Lbr Magdy/2nd Weight Sex Delivery Anes PTL Lv   2A  20 33w3d   F CS-LTranv   ARNIE   2B  20 33w3d   F CS-LTranv Spinal  ARNIE   1 Term 17 37w0d / 00:13 3360 g (7 lb 6 5 oz) F Vag-Spont EPI N ARNIE         The following portions of the patient's history were reviewed and updated as appropriate: allergies, current medications, past family history, past medical history, past social history, past surgical history and problem list     Review of Systems  Review of Systems   Constitutional: Negative for chills, fatigue, fever and unexpected weight change  HENT: Negative for dental problem, sinus pressure and sinus pain  Eyes: Negative for visual disturbance  Respiratory: Negative for cough, shortness of breath and wheezing  Cardiovascular: Negative for chest pain and leg swelling  Gastrointestinal: Negative for constipation, diarrhea, nausea and vomiting  Genitourinary: Negative for menstrual problem, pelvic pain and urgency  Musculoskeletal: Negative for back pain  Allergic/Immunologic: Negative for environmental allergies  Neurological: Negative for dizziness and headaches  Objective     /78 (BP Location: Right arm, Patient Position: Sitting, Cuff Size: Standard)   Temp (!) 95 °F (35 °C)   Ht 5' 3" (1 6 m)   Wt 62 1 kg (137 lb)   LMP 2019   BMI 24 27 kg/m²   General appearance: alert and oriented, in no acute distress  Lungs: clear to auscultation bilaterally  Heart: regular rate and rhythm, S1, S2 normal, no murmur, click, rub or gallop  Abdomen: soft, non-tender; bowel sounds normal; no masses,  no organomegaly and Fundus firm nontender -2 incision clean dry intact healing well,  glue still present advised plain Vaseline and gently removing when able to be peeled off      Assessment  26year-old  1/2 weeks post primary  for twins  labor vertex transverse steadily recovering         Plan  Return in 2-3 weeks for postpartum exam

## 2020-08-06 ENCOUNTER — POSTPARTUM VISIT (OUTPATIENT)
Dept: OBGYN CLINIC | Facility: CLINIC | Age: 25
End: 2020-08-06

## 2020-08-06 VITALS
WEIGHT: 130 LBS | SYSTOLIC BLOOD PRESSURE: 120 MMHG | BODY MASS INDEX: 23.03 KG/M2 | TEMPERATURE: 97.7 F | DIASTOLIC BLOOD PRESSURE: 60 MMHG

## 2020-08-06 DIAGNOSIS — Z30.011 ENCOUNTER FOR INITIAL PRESCRIPTION OF CONTRACEPTIVE PILLS: ICD-10-CM

## 2020-08-06 PROCEDURE — 99024 POSTOP FOLLOW-UP VISIT: CPT | Performed by: OBSTETRICS & GYNECOLOGY

## 2020-08-06 PROCEDURE — 1111F DSCHRG MED/CURRENT MED MERGE: CPT | Performed by: OBSTETRICS & GYNECOLOGY

## 2020-08-06 RX ORDER — NORGESTIMATE AND ETHINYL ESTRADIOL 0.25-0.035
1 KIT ORAL DAILY
Qty: 84 TABLET | Refills: 1 | Status: SHIPPED | OUTPATIENT
Start: 2020-08-06 | End: 2020-12-03

## 2020-08-06 NOTE — PROGRESS NOTES
Jered Kohli is a 21-year-old  here for postpartum visit  She is 4 weeks post partum following a primary low transverse  section for twins  Twins have been home from NICU about 2 weeks and are doing well they are breast-feeding and also taking pumped milk from bottles  Patient's fiance is still home to help her and they have a plan for both grandmom's to be available as well when he is back to work  I have fully reviewed the prenatal and intrapartum course  The delivery was at 35 gestational weeks due to progressive  labor  Outcome: PCS  Anesthesia: spinal   Postpartum course has been uncomplicated  Baby's course has been doing well without problems  Baby is feeding breast   Patient is tolerating regular diet, Bleeding staining only  Bowel function is normal  Bladder function is normal  Patient is not sexually active  Contraception method is Planning vasectomy fully counseled would like to restart Sprintec as that is what work for her the best in the past patient fully counseled to wait till 6 weeks postpartum   Postpartum depression screening: negative EPDS 0    Physical:   Vitals:    20 1129   BP: 120/60   Temp: 97 7 °F (36 5 °C)       Objective     /60   Temp 97 7 °F (36 5 °C)   Wt 59 kg (130 lb)   LMP 2019   Breastfeeding Yes   BMI 23 03 kg/m²   General appearance: alert and oriented, in no acute distress  Lungs: clear to auscultation bilaterally  Heart: regular rate and rhythm, S1, S2 normal, no murmur, click, rub or gallop  Abdomen: soft, non-tender; bowel sounds normal; no masses,  no organomegaly and Incision clean dry intact well-healed some palpable scar tissue under incision reviewed and discussed massaging and scars treatment overall well-healed  Pelvic: external genitalia normal, vagina normal without discharge, no cervical motion tenderness, no adnexal masses or tenderness and Uterus mobile nontender top normal size        Assessment 26year-old  4 weeks post  section for twins steadily recovering  Plan:  Patient would like to start [facility] her partner is planning vasectomy, patient fully counseled    Return in 3 months for annual or sooner as needed,

## 2020-12-03 ENCOUNTER — ANNUAL EXAM (OUTPATIENT)
Dept: OBGYN CLINIC | Facility: CLINIC | Age: 25
End: 2020-12-03
Payer: COMMERCIAL

## 2020-12-03 VITALS
SYSTOLIC BLOOD PRESSURE: 122 MMHG | WEIGHT: 126.2 LBS | BODY MASS INDEX: 22.36 KG/M2 | HEIGHT: 63 IN | DIASTOLIC BLOOD PRESSURE: 72 MMHG

## 2020-12-03 DIAGNOSIS — Z30.09 ENCOUNTER FOR OTHER GENERAL COUNSELING OR ADVICE ON CONTRACEPTION: ICD-10-CM

## 2020-12-03 DIAGNOSIS — Z01.419 WELL WOMAN EXAM WITH ROUTINE GYNECOLOGICAL EXAM: Primary | ICD-10-CM

## 2020-12-03 PROBLEM — Z98.891 S/P CESAREAN SECTION: Status: RESOLVED | Noted: 2020-07-01 | Resolved: 2020-12-03

## 2020-12-03 PROBLEM — Z30.9 CONTRACEPTIVE MANAGEMENT: Status: ACTIVE | Noted: 2020-12-03

## 2020-12-03 PROCEDURE — 3008F BODY MASS INDEX DOCD: CPT | Performed by: NURSE PRACTITIONER

## 2020-12-03 PROCEDURE — 3725F SCREEN DEPRESSION PERFORMED: CPT | Performed by: NURSE PRACTITIONER

## 2020-12-03 PROCEDURE — 1036F TOBACCO NON-USER: CPT | Performed by: NURSE PRACTITIONER

## 2020-12-03 PROCEDURE — 99395 PREV VISIT EST AGE 18-39: CPT | Performed by: NURSE PRACTITIONER

## 2021-02-03 ENCOUNTER — TELEPHONE (OUTPATIENT)
Dept: OBGYN CLINIC | Facility: CLINIC | Age: 26
End: 2021-02-03

## 2021-02-04 ENCOUNTER — OFFICE VISIT (OUTPATIENT)
Dept: OBGYN CLINIC | Facility: CLINIC | Age: 26
End: 2021-02-04
Payer: COMMERCIAL

## 2021-02-04 VITALS
BODY MASS INDEX: 22.32 KG/M2 | SYSTOLIC BLOOD PRESSURE: 130 MMHG | DIASTOLIC BLOOD PRESSURE: 82 MMHG | WEIGHT: 126 LBS | HEIGHT: 63 IN

## 2021-02-04 DIAGNOSIS — A60.04 HERPETIC ULCERATION OF VULVA: Primary | ICD-10-CM

## 2021-02-04 DIAGNOSIS — Z30.09 COUNSELING FOR INITIATION OF BIRTH CONTROL METHOD: ICD-10-CM

## 2021-02-04 DIAGNOSIS — Z11.3 SCREENING FOR STD (SEXUALLY TRANSMITTED DISEASE): ICD-10-CM

## 2021-02-04 PROCEDURE — 99215 OFFICE O/P EST HI 40 MIN: CPT | Performed by: PHYSICIAN ASSISTANT

## 2021-02-04 RX ORDER — NORETHINDRONE ACETATE AND ETHINYL ESTRADIOL 1MG-20(21)
1 KIT ORAL DAILY
Qty: 28 TABLET | Refills: 3 | Status: SHIPPED | OUTPATIENT
Start: 2021-02-04 | End: 2021-04-21 | Stop reason: ALTCHOICE

## 2021-02-04 RX ORDER — VALACYCLOVIR HYDROCHLORIDE 1 G/1
1000 TABLET, FILM COATED ORAL 2 TIMES DAILY
Qty: 14 TABLET | Refills: 0 | Status: SHIPPED | OUTPATIENT
Start: 2021-02-04 | End: 2022-03-02

## 2021-02-04 RX ORDER — SERTRALINE HYDROCHLORIDE 25 MG/1
25 TABLET, FILM COATED ORAL DAILY
Qty: 30 TABLET | Refills: 1 | Status: SHIPPED | OUTPATIENT
Start: 2021-02-04 | End: 2021-03-24

## 2021-02-04 RX ORDER — MULTIVITAMIN
1 TABLET ORAL DAILY
COMMUNITY

## 2021-02-04 NOTE — PROGRESS NOTES
Assessment/Plan   Diagnoses and all orders for this visit:    Herpetic ulceration of vulva  -     Hepatitis B surface antigen; Future  -     Hepatitis C antibody; Future  -     Herpes I /II IgM Ab Indriect; Future  -     Herpes I/II IgG Antibodies; Future  -     HIV 1/2 Antigen/Antibody (4th Generation) w Reflex [facility]; Future  -     RPR; Future  -     valACYclovir (VALTREX) 1,000 mg tablet; Take 1 tablet (1,000 mg total) by mouth 2 (two) times a day for 7 days  -     Ct, Ng, Trich vag by ZHANNA  -     Herpes Simplex Virus Culture with Typing    Screening for STD (sexually transmitted disease)  -     Hepatitis B surface antigen; Future  -     Hepatitis C antibody; Future  -     Herpes I /II IgM Ab Indriect; Future  -     Herpes I/II IgG Antibodies; Future  -     HIV 1/2 Antigen/Antibody (4th Generation) w Reflex [facility]; Future  -     RPR; Future  -     Ct, Ng, Trich vag by ZHANNA  -     Herpes Simplex Virus Culture with Typing    Postpartum depression  -     sertraline (ZOLOFT) 25 mg tablet; Take 1 tablet (25 mg total) by mouth daily    Counseling for initiation of birth control method  -     norethindrone-ethinyl estradiol (JUNEL FE 1/20) 1-20 MG-MCG per tablet; Take 1 tablet by mouth daily    Discussion  Due to physical exam findings today we collected cultures to screen for C/G/T  Also obtained cultures of multiple lesions to rule out herpes  Reviewed with the patient that the virus needs to be shedding at the lesion for the culture to come up positive  Will plan to call and review the results in great detail  The bloodwork will help assist the clinical picture, especially if the cultures come back negative  I reviewed with patient I did not want to have a lengthy discussion on herpes until we have more results and she agreed with the plan  Denies any history for patient or partner of genital herpes outbreak in the past or oral cold sores to her knowledge   Will plan to treat with Valtrex 1000 mg 1 tab po BID x 7 days - reviewed antiviral will not harm her in the event genital herpes is ruled out  Encourage patient to utilize [employer] soaks as well to help lesions to dry and heal   Will also plan to start zoloft 25 mg 1 tab po daily  Reviewed common side effects  If no change in symptoms after a couple weeks can consider increasing to 50 mg daily  Reviewed behavioral therapy and Baby and Me counseling as an option as well  Will plan follow-up in 6 weeks  Desires OCP start-up at this time as well - will trial lower dose OCP - Loestrin Fe 1/20:   1)  Begin the pill on the Sunday of the week of your next period, starting with the first pill in the packet (If your period starts on a Sunday - start the pill that very same day; if your period starts any other day of the week - wait until the Sunday of that week to start with the first pill)  Take it the same time daily, within the same hour time frame (such as between 8 and 9am)  2) It common to experience some irregular bleeding when newly starting the pill  This should resolve after 3 months of use  Also minor side effects such as breast tenderness, minor headaches and nausea may occur, but typically resolve after continuing on the pill for at least 3 months  3)  Call if you experience severe headaches, visual disturbances, chest pain, shortness of breath, abdominal pain, pain tingling or weakness in arms or legs  4) Advise a back-up method, like condoms, during the first month on the OCP, if misses any pills, or is put on antibiotics  Reviewed missed pill protocol;   5) Advise safe sexual practices and the importance of condoms to prevent the transmission of STDs  6) Return visit in 3 months for pill & blood pressure check - can check in at 6 week check-up after starting zoloft and further devise plan  All questions answered at this time  Patient to call with further questions/concerns  Will call with culture and bloodwork results    RTO in 6 weeks for follow-up to initiation of zoloft  I have spent 45 minutes with Patient  today in which greater than 50% of this time was spent in counseling/coordination of care regarding Risks and benefits of tx options, Intructions for management, Patient and family education, Importance of tx compliance, Risk factor reductions and Impressions  Subjective     HPI   Jose Elias Estevez is a 26 y o  female who presents with multiple issues  Patient delivered 7/9/20 primary LTCS F/F at 33w3d; Breast fed for 6 months and now on formula x 1 month - girls are doing really well; Her EPDS score was a 0 at her PP visit 8/6/20 -  was still home and she had a lot of support at that time; Decided on [address] [place] [address] since worked well in the past - restarted in November and d/mague after about a week due to worsening depression symptoms and hormonal fluctuations  Patient feels episodes of anxiety, panic attacks, increased crying episodes - difficult being home all the time due to [place]  EPDS currently 17   continues to be supportive despite being back at work - encourages her to get out of the house once in a while  Treated before in the past - depressed type symptoms started about a year after her first baby - started on LExapro 20 mg - seemed to do ok on it - helped lesson her symptoms but felt "blah" in general  Patient desires to restart birth control at this time as well   LMP - 1/9/21; Periods are usually reg q month; No excessive bleeding; No intermenstrual bleeding or spotting; Cramps are tolerable  No menopausal symptoms: No hot flashes/night sweats, problems with intercourse, vaginal dryness; sleeping well  No abd/pelvic pain or HAs;   Pt is sexually active in a mutually monog/ sexual relationship x 6 years; No issues with intercourse;  She is ok to have std/hiv/hep testing; Feels safe at home  Current contraception: condoms; was considering sterilization but not 100% at this time  Patient also concerned about her vulva-vaginal area - knicked herself shaving last week and didn't think anything of it at first  Then, Friday she started with vulvar burning, irritation, swelling - at first thought just due to shaving but continues to worsen so desires to be checked - burns/hurts when urine touches the outside otherwise denies any other urinary symptoms  No significant discharge or odor  Last Pap - 1/20/20 - WNL C/G neg      Review of Systems   Constitutional: Negative for activity change, fatigue, fever and unexpected weight change  HENT: Negative for congestion, dental problem, sinus pressure and sinus pain  Eyes: Negative for visual disturbance  Respiratory: Negative for cough, shortness of breath and wheezing  Cardiovascular: Negative for chest pain and leg swelling  Gastrointestinal: Negative for abdominal distention, abdominal pain, blood in stool, constipation, diarrhea, nausea and vomiting  Endocrine: Negative for polydipsia  Genitourinary: Positive for genital sores and vaginal pain  Negative for difficulty urinating, dyspareunia, dysuria, frequency, hematuria, menstrual problem, pelvic pain, urgency, vaginal bleeding and vaginal discharge  Musculoskeletal: Negative for arthralgias and back pain  Allergic/Immunologic: Negative for environmental allergies  Neurological: Negative for dizziness, seizures and headaches  Psychiatric/Behavioral: Positive for dysphoric mood  Negative for sleep disturbance  The patient is nervous/anxious          The following portions of the patient's history were reviewed and updated as appropriate: allergies, current medications, past family history, past medical history, past social history, past surgical history and problem list          Past Medical History:   Diagnosis Date    Carpal tunnel syndrome     with pregnancy    Depression 11/2/2017    POTS (postural orthostatic tachycardia syndrome)     Varicella        Past Surgical History:   Procedure Laterality Date    PALATE SURGERY      [place]  DELIVERY ONLY N/A 2020    Procedure:  SECTION ();   Surgeon: Gloria Hickey MD;  Location: [facility] ;  Service: Obstetrics       Family History   Problem Relation Age of Onset    Anxiety disorder Mother     Anemia Mother     Hypertension Mother     Hypotension Mother     Ovarian cancer Mother 29    Bipolar disorder Father     Arthritis Father     Hypertension Father     Hyperlipidemia Father     No Known Problems Sister     Hypertension Maternal Grandmother     Diabetes Paternal Grandmother     Stroke Paternal Grandmother     No Known Problems Daughter     Premature birth Daughter     Premature birth Daughter        Social History     Socioeconomic History    Marital status: Single     Spouse name: Not on file    Number of children: Not on file    Years of education: Not on file    Highest education level: Not on file   Occupational History    Not on file   Social Needs    Financial resource strain: Not on file    Food insecurity     Worry: Not on file     Inability: Not on file    Transportation needs     Medical: Not on file     Non-medical: Not on file   Tobacco Use    Smoking status: Never Smoker    Smokeless tobacco: Never Used   Substance and Sexual Activity    Alcohol use: Yes     Frequency: Monthly or less     Drinks per session: 1 or 2     Binge frequency: Never     Comment: occ    Drug use: Never    Sexual activity: Yes     Partners: Male     Birth control/protection: Condom Male   Lifestyle    Physical activity     Days per week: Not on file     Minutes per session: Not on file    Stress: Not on file   Relationships    Social connections     Talks on phone: Not on file     Gets together: Not on file     Attends Congregational service: Not on file     Active member of club or organization: Not on file     Attends meetings of clubs or organizations: Not on file     Relationship status: Not on file    Intimate partner violence     Fear of current or ex partner: Not on file     Emotionally abused: Not on file     Physically abused: Not on file     Forced sexual activity: Not on file   Other Topics Concern    Not on file   Social History Narrative    Exercise habits    Lives with parents     Pets/Animals: Bird, Dog, Rabbit    Sleeps 8-10 hrs/day         Current Outpatient Medications:     Multiple Vitamin (multivitamin) tablet, Take 1 tablet by mouth daily, Disp: , Rfl:     Allergies   Allergen Reactions    Amoxicillin Anaphylaxis and Hives     As a child and was admitted to hosp because she "almost "       Objective   Vitals:    21 0844   BP: 130/82   BP Location: Left arm   Patient Position: Sitting   Weight: 57 2 kg (126 lb)   Height: 5' 3" (1 6 m)     Physical Exam  Vitals signs reviewed  Constitutional:       General: She is awake  She is not in acute distress  Appearance: Normal appearance  She is well-developed and well-groomed  She is not diaphoretic  Eyes:      Conjunctiva/sclera: Conjunctivae normal    Abdominal:      General: There is no distension  Palpations: Abdomen is soft  There is no hepatomegaly, splenomegaly or mass  Tenderness: There is no abdominal tenderness  Hernia: No hernia is present  There is no hernia in the left inguinal area or right inguinal area  Genitourinary:     General: Normal vulva  Exam position: Supine  Pubic Area: No rash or pubic lice  Labia:         Right: Tenderness and lesion present  No rash or injury  Left: Tenderness and lesion present  No rash or injury  Urethra: No prolapse, urethral pain, urethral swelling or urethral lesion  Vagina: No signs of injury and foreign body  Vaginal discharge and lesions present  No erythema, tenderness, bleeding or prolapsed vaginal walls  Cervix: Discharge, friability, lesion and erythema present  No cervical motion tenderness or cervical bleeding        Uterus: Not deviated, not enlarged, not fixed, not tender and no uterine prolapse  Adnexa:         Right: No mass, tenderness or fullness  Left: No mass, tenderness or fullness  Comments: Multiple lesions appreciated throughout vulva - B/L labia majora, B/L inner labia majora, throughout vaginal introitus and a few cm into the vaginal canal; then most of vaginal canal clear; (+) lesions appreciated throughout her cervix; thick yellow/clear cervical and vaginal discharge appreciated in moderate amount  Largest lesion mid left labia majora looks to have pustular components - possibly where she nicked herself shaving  Other lesions on vulva seem to be scabbed over - possible dried/scabbed ulcerations vs folliculitis; All inner labia majora, vaginal introitus/canal and cervical lesions are flat, circular, red about 1-2 mm in size - some look to be more ulcerated than others   Lymphadenopathy:      Lower Body: No right inguinal adenopathy  No left inguinal adenopathy  Skin:     General: Skin is warm and dry  Neurological:      Mental Status: She is alert and oriented to person, place, and time  Psychiatric:         Mood and Affect: Mood and affect normal          Speech: Speech normal          Behavior: Behavior normal  Behavior is cooperative  Thought Content:  Thought content normal          Judgment: Judgment normal

## 2021-02-06 LAB
C TRACH RRNA SPEC QL NAA+PROBE: NEGATIVE
N GONORRHOEA RRNA SPEC QL NAA+PROBE: NEGATIVE
T VAGINALIS RRNA SPEC QL NAA+PROBE: NEGATIVE

## 2021-02-09 LAB
HBV SURFACE AB SER-ACNC: 3.3 MIU/ML
HCV AB S/CO SERPL IA: <0.1 S/CO RATIO (ref 0–0.9)
HIV 1+2 AB+HIV1 P24 AG SERPL QL IA: NON REACTIVE
HSV1 IGG SER IA-ACNC: <0.91 INDEX (ref 0–0.9)
HSV1 IGM TITR SER IF: NORMAL TITER
HSV2 IGG SER IA-ACNC: <0.91 INDEX (ref 0–0.9)
HSV2 IGM TITR SER IF: NORMAL TITER
RPR SER QL: NON REACTIVE

## 2021-02-11 LAB — HBV SURFACE AG SERPL QL IA: NEGATIVE

## 2021-02-26 ENCOUNTER — TELEMEDICINE (OUTPATIENT)
Dept: FAMILY MEDICINE CLINIC | Facility: CLINIC | Age: 26
End: 2021-02-26
Payer: COMMERCIAL

## 2021-02-26 DIAGNOSIS — Z20.822 EXPOSURE TO COVID-19 VIRUS: ICD-10-CM

## 2021-02-26 DIAGNOSIS — Z20.822 EXPOSURE TO COVID-19 VIRUS: Primary | ICD-10-CM

## 2021-02-26 PROCEDURE — U0003 INFECTIOUS AGENT DETECTION BY NUCLEIC ACID (DNA OR RNA); SEVERE ACUTE RESPIRATORY SYNDROME CORONAVIRUS 2 (SARS-COV-2) (CORONAVIRUS DISEASE [COVID-19]), AMPLIFIED PROBE TECHNIQUE, MAKING USE OF HIGH THROUGHPUT TECHNOLOGIES AS DESCRIBED BY CMS-2020-01-R: HCPCS | Performed by: FAMILY MEDICINE

## 2021-02-26 PROCEDURE — U0005 INFEC AGEN DETEC AMPLI PROBE: HCPCS | Performed by: FAMILY MEDICINE

## 2021-02-26 PROCEDURE — 99213 OFFICE O/P EST LOW 20 MIN: CPT | Performed by: FAMILY MEDICINE

## 2021-02-26 NOTE — PROGRESS NOTES
COVID-19 Virtual Visit     Assessment/Plan:    Problem List Items Addressed This Visit     None      Visit Diagnoses     Exposure to COVID-19 virus    -  Primary    Relevant Orders    Novel Coronavirus (Covid-19),PCR [facility] - Collected at Mobile Vans or [facility]         Disposition:     I recommended self-quarantine for 10 days and to watch for symptoms until 14 days after exposure  If patient were to develop symptoms, they should self isolate and call our office for further guidance  I referred patient to one of our centralized sites for a COVID-19 swab  I have spent 15 minutes directly with the patient  Encounter provider Charleen Connors MD    Provider located at [address] [address]    Recent Visits  No visits were found meeting these conditions  Showing recent visits within past 7 days and meeting all other requirements     Today's Visits  Date Type Provider Dept   02/26/21 Telemedicine Charleen Connors, [facility] today's visits and meeting all other requirements     Future Appointments  No visits were found meeting these conditions  Showing future appointments within next 150 days and meeting all other requirements      This virtual check-in was done via [employer] and patient was informed that this is not a secure, HIPAA-compliant platform  She agrees to proceed  Patient agrees to participate in a virtual check in via telephone or video visit instead of presenting to the office to address urgent/immediate medical needs  Patient is aware this is a billable service  After connecting through [facility], the patient was identified by name and date of birth  Brayan Merrill was informed that this was a telemedicine visit and that the exam was being conducted confidentially over secure lines  My office door was closed  No one else was in the room   Brayan Merrill acknowledged consent and understanding of privacy and security of the telemedicine visit  I informed the patient that I have reviewed her record in Epic and presented the opportunity for her to ask any questions regarding the visit today  The patient agreed to participate  Subjective:   Doroteo Vincent is a 26 y o  female who is concerned about COVID-19  Patient's symptoms include nasal congestion, rhinorrhea, sore throat, cough and headache  Patient denies fever, chills, fatigue, malaise, anosmia, loss of taste, shortness of breath, chest tightness, nausea, vomiting, diarrhea and myalgias  Date of symptom onset: 2021  Date of exposure: 2021    Exposure:   Contact with a person who is under investigation (PUI) for or who is positive for COVID-19 within the last 14 days?: Yes    Hospitalized recently for fever and/or lower respiratory symptoms?: No      Currently a healthcare worker that is involved in direct patient care?: No      Works in a special setting where the risk of COVID-19 transmission may be high? (this may include long-term care, correctional and group home facilities; homeless shelters; assisted-living facilities and group homes ): No      Resident in a special setting where the risk of COVID-19 transmission may be high? (this may include long-term care, correctional and group home facilities; homeless shelters; assisted-living facilities and group homes ): No      No results found for: Lila Majano, [address] [place] [address], [address] [place] [address],[address], [place] [address]  Past Medical History:   Diagnosis Date    Carpal tunnel syndrome     with pregnancy    Depression 2017    POTS (postural orthostatic tachycardia syndrome)     Varicella      Past Surgical History:   Procedure Laterality Date    PALATE SURGERY      [place]  DELIVERY ONLY N/A 2020    Procedure:  SECTION ();   Surgeon: Phyllis Bryan MD;  Location: [facility] ;  Service: Obstetrics     Current Outpatient Medications   Medication Sig Dispense Refill    Multiple Vitamin (multivitamin) tablet Take 1 tablet by mouth daily      norethindrone-ethinyl estradiol (JUNEL FE 1/20) 1-20 MG-MCG per tablet Take 1 tablet by mouth daily 28 tablet 3    sertraline (ZOLOFT) 25 mg tablet Take 1 tablet (25 mg total) by mouth daily 30 tablet 1    valACYclovir (VALTREX) 1,000 mg tablet Take 1 tablet (1,000 mg total) by mouth 2 (two) times a day for 7 days 14 tablet 0     No current facility-administered medications for this visit  Allergies   Allergen Reactions    Amoxicillin Anaphylaxis and Hives     As a child and was admitted to [facility] because she "almost "       Review of Systems   Constitutional: Negative for chills, fatigue and fever  HENT: Positive for congestion, rhinorrhea and sore throat  Respiratory: Positive for cough  Negative for chest tightness and shortness of breath  Gastrointestinal: Negative for diarrhea, nausea and vomiting  Musculoskeletal: Negative for myalgias  Neurological: Positive for headaches  Objective: There were no vitals filed for this visit  Physical Exam  Constitutional:       General: She is not in acute distress  Appearance: Normal appearance  She is not ill-appearing, toxic-appearing or diaphoretic  HENT:      Head: Normocephalic and atraumatic  Eyes:      General:         Right eye: No discharge  Left eye: No discharge  Conjunctiva/sclera: Conjunctivae normal    Pulmonary:      Effort: Pulmonary effort is normal    Neurological:      Mental Status: She is alert  Psychiatric:         Mood and Affect: Mood normal          Behavior: Behavior normal          Thought Content: Thought content normal          Judgment: Judgment normal        VIRTUAL VISIT DISCLAIMER    Constantine Dang acknowledges that she has consented to an online visit or consultation   She understands that the online visit is based solely on information provided by her, and that, in the absence of a face-to-face physical evaluation by the physician, the diagnosis she receives is both limited and provisional in terms of accuracy and completeness  This is not intended to replace a full medical face-to-face evaluation by the physician  [facility] understands and accepts these terms

## 2021-02-27 LAB — SARS-COV-2 RNA RESP QL NAA+PROBE: POSITIVE

## 2021-03-01 ENCOUNTER — TELEPHONE (OUTPATIENT)
Dept: FAMILY MEDICINE CLINIC | Facility: CLINIC | Age: 26
End: 2021-03-01

## 2021-03-01 NOTE — TELEPHONE ENCOUNTER
Please let patient know her COVID testing is positive  She needs to self quarantine for 14 days  Please make virtual appointment so we can evaluate

## 2021-03-01 NOTE — TELEPHONE ENCOUNTER
[facility] requesting a call back  Please schedule virtual appt f/u with Dr Mabel Watson she is doing virtual's only today

## 2021-03-24 ENCOUNTER — OFFICE VISIT (OUTPATIENT)
Dept: OBGYN CLINIC | Facility: CLINIC | Age: 26
End: 2021-03-24
Payer: COMMERCIAL

## 2021-03-24 VITALS
SYSTOLIC BLOOD PRESSURE: 124 MMHG | DIASTOLIC BLOOD PRESSURE: 72 MMHG | HEIGHT: 63 IN | BODY MASS INDEX: 21.79 KG/M2 | WEIGHT: 123 LBS

## 2021-03-24 PROCEDURE — 1036F TOBACCO NON-USER: CPT | Performed by: PHYSICIAN ASSISTANT

## 2021-03-24 PROCEDURE — 3008F BODY MASS INDEX DOCD: CPT | Performed by: PHYSICIAN ASSISTANT

## 2021-03-24 PROCEDURE — 99213 OFFICE O/P EST LOW 20 MIN: CPT | Performed by: PHYSICIAN ASSISTANT

## 2021-03-24 NOTE — PROGRESS NOTES
Assessment/Plan:    No problem-specific Assessment & Plan notes found for this encounter  Diagnoses and all orders for this visit:    Postpartum depression  -     sertraline (ZOLOFT) 50 mg tablet; Take 1 tablet (50 mg total) by mouth daily          Subjective:      Patient ID: Zak Gee is a 26 y o  female  Pt as follow up for pp depression and anxiety  Feels like she will have anxiety attacks and sx a few times per week  Feels like sx are less now than prior to starting her Zoloft, but still present  Pts current EPDS today is 8 (down from 17 at last visit)  Pt has not yet spoken w a counselor but feels it would be something that may benefit her  [facility]  We did review pts dietary habits as well  She does drink caffeine daily and also does work our daily  We reviewed the option to cut out caffeine and see if that can help improve sx  She will plan to increase her Zoloft to 50 mg QD  We did also discuss the option to trial an antihistamine to help break anxiety sx when they occur  If no resolve may also consider addition of sporadic anxiolytic  The following portions of the patient's history were reviewed and updated as appropriate: allergies, current medications, past family history, past medical history, past social history, past surgical history and problem list     Review of Systems   Constitutional: Negative  Respiratory: Negative  Gastrointestinal: Negative  Endocrine: Negative  Genitourinary: Negative  Neurological: Positive for tremors  Psychiatric/Behavioral: Positive for dysphoric mood  The patient is nervous/anxious  Objective:      /72 (BP Location: Left arm, Patient Position: Sitting, Cuff Size: Standard)   Ht 5' 3" (1 6 m)   Wt 55 8 kg (123 lb)   LMP 03/15/2021 (Approximate)   BMI 21 79 kg/m²          Physical Exam  Constitutional:       Appearance: She is normal weight     HENT:      Head: Normocephalic and atraumatic  Cardiovascular:      Rate and Rhythm: Normal rate and regular rhythm  Pulmonary:      Effort: Pulmonary effort is normal       Breath sounds: Normal breath sounds  Skin:     General: Skin is warm and dry  Neurological:      General: No focal deficit present  Mental Status: She is alert  Mental status is at baseline  Psychiatric:         Mood and Affect: Mood normal          Behavior: Behavior normal          Thought Content:  Thought content normal          Judgment: Judgment normal

## 2021-03-24 NOTE — PATIENT INSTRUCTIONS
Will plan to increase her Zoloft to 50 mg QD  Consider trial of d/c caffeine  Can also consider trial of antihistamine  [facility]  Pt will plan to reach out to [facility] to schedule  Will plan f/u 2 mths to evaluate tx response at a higher dose

## 2021-04-21 ENCOUNTER — OFFICE VISIT (OUTPATIENT)
Dept: OBGYN CLINIC | Facility: CLINIC | Age: 26
End: 2021-04-21
Payer: COMMERCIAL

## 2021-04-21 VITALS
BODY MASS INDEX: 21.44 KG/M2 | DIASTOLIC BLOOD PRESSURE: 60 MMHG | HEIGHT: 63 IN | WEIGHT: 121 LBS | SYSTOLIC BLOOD PRESSURE: 112 MMHG | TEMPERATURE: 97.8 F

## 2021-04-21 DIAGNOSIS — Z30.41 ENCOUNTER FOR SURVEILLANCE OF CONTRACEPTIVE PILLS: Primary | ICD-10-CM

## 2021-04-21 PROCEDURE — 3008F BODY MASS INDEX DOCD: CPT | Performed by: NURSE PRACTITIONER

## 2021-04-21 PROCEDURE — 99213 OFFICE O/P EST LOW 20 MIN: CPT | Performed by: NURSE PRACTITIONER

## 2021-04-21 RX ORDER — NORGESTIMATE AND ETHINYL ESTRADIOL 0.25-0.035
1 KIT ORAL DAILY
Qty: 84 TABLET | Refills: 3 | Status: SHIPPED | OUTPATIENT
Start: 2021-04-21 | End: 2022-01-19

## 2021-04-21 NOTE — PROGRESS NOTES
Assessment/Plan:    Contraceptive management  Reviewed options such as stopping OCP and switching to another contraceptive method vs switching OCP  Pt would like to go back on Sprintec as took for many years without any issues  Only switched due to depression and anxiety but ultimately started on Zoloft to manage after no change when switching OCP  Rx for Sprintec sent to pharmacy  Will finish out current pill pack and start [facility] with next month  Back up method x 1 month  Call if continues to have irregular bleeding or any other unwanted side effects after 3 months  Reviewed with patient about stopping Zoloft  Do not abruptly stop Zoloft but rather wean  Reviewed decreasing to 25 mg daily x 2 weeks then every other day x 2 weeks then stopping  Pt plans on weaning after initial three months on Sprintec as does not want to confuse side effects  RTO annual exam or sooner as needed  Diagnoses and all orders for this visit:    Encounter for surveillance of contraceptive pills  -     norgestimate-ethinyl estradiol (ORTHO-CYCLEN) 0 25-35 MG-MCG per tablet; Take 1 tablet by mouth daily          Subjective:      Patient ID: Graciela Simmons is a 26 y o  female  She started Junel 1/20 Fe in January  Margie ok had some spotting for about 5 days, mid pack  Then she had her menses which seemed normal     Then month two spotting lasted 5 days or so mid pack, that menses was normal     Then month three stopped bleeding for 1-2 days after menses stopped  That bleeding went on for 7-8 days  Bleeding was heavy, soaking a tampon every hour for the whole 8 days  Continued to take pills same time every day  Did not miss or skip any pills  Then menses at end of pill pack which was normal then 1-2 days after stopping restarted bleeding again  Bleeding was heavy, soaking a pad an hour for the last 12 days or so  Just stopped today  She has about 1 5 weeks left of current pill pack         The following portions of the patient's history were reviewed and updated as appropriate: allergies, current medications, past family history, past medical history, past social history, past surgical history and problem list     Review of Systems      Objective:      /60 (BP Location: Right arm, Patient Position: Sitting, Cuff Size: Standard)   Temp 97 8 °F (36 6 °C) (Temporal)   Ht 5' 3" (1 6 m)   Wt 54 9 kg (121 lb)   BMI 21 43 kg/m²          Physical Exam  Vitals signs and nursing note reviewed  Constitutional:       Appearance: Normal appearance  She is normal weight  Cardiovascular:      Rate and Rhythm: Normal rate and regular rhythm  Pulses: Normal pulses  Heart sounds: Normal heart sounds  Pulmonary:      Effort: Pulmonary effort is normal       Breath sounds: Normal breath sounds  Abdominal:      General: Abdomen is flat  Bowel sounds are normal       Palpations: Abdomen is soft  Neurological:      Mental Status: She is alert and oriented to person, place, and time  Psychiatric:         Mood and Affect: Mood normal          Behavior: Behavior normal          Thought Content:  Thought content normal          Judgment: Judgment normal

## 2021-04-21 NOTE — ASSESSMENT & PLAN NOTE
Reviewed options such as stopping OCP and switching to another contraceptive method vs switching OCP  Pt would like to go back on Sprintec as took for many years without any issues  Only switched due to depression and anxiety but ultimately started on Zoloft to manage after no change when switching OCP  Rx for Sprintec sent to pharmacy  Will finish out current pill pack and start [facility] with next month  Back up method x 1 month  Call if continues to have irregular bleeding or any other unwanted side effects after 3 months  Reviewed with patient about stopping Zoloft  Do not abruptly stop Zoloft but rather wean  Reviewed decreasing to 25 mg daily x 2 weeks then every other day x 2 weeks then stopping  Pt plans on weaning after initial three months on Sprintec as does not want to confuse side effects  RTO annual exam or sooner as needed

## 2021-12-08 ENCOUNTER — OFFICE VISIT (OUTPATIENT)
Dept: FAMILY MEDICINE CLINIC | Facility: CLINIC | Age: 26
End: 2021-12-08
Payer: COMMERCIAL

## 2021-12-08 VITALS
TEMPERATURE: 96.2 F | WEIGHT: 122 LBS | DIASTOLIC BLOOD PRESSURE: 70 MMHG | BODY MASS INDEX: 21.62 KG/M2 | HEART RATE: 82 BPM | HEIGHT: 63 IN | RESPIRATION RATE: 16 BRPM | SYSTOLIC BLOOD PRESSURE: 130 MMHG

## 2021-12-08 DIAGNOSIS — R00.2 PALPITATIONS: Primary | ICD-10-CM

## 2021-12-08 DIAGNOSIS — Q24.8 ABNORMAL CARDIAC VALVE: ICD-10-CM

## 2021-12-08 DIAGNOSIS — R42 DIZZINESS: ICD-10-CM

## 2021-12-08 PROCEDURE — 99213 OFFICE O/P EST LOW 20 MIN: CPT | Performed by: INTERNAL MEDICINE

## 2021-12-08 PROCEDURE — 3008F BODY MASS INDEX DOCD: CPT | Performed by: INTERNAL MEDICINE

## 2021-12-08 PROCEDURE — 1036F TOBACCO NON-USER: CPT | Performed by: INTERNAL MEDICINE

## 2021-12-28 ENCOUNTER — HOSPITAL ENCOUNTER (OUTPATIENT)
Dept: NON INVASIVE DIAGNOSTICS | Facility: HOSPITAL | Age: 26
Discharge: HOME | End: 2021-12-28
Attending: INTERNAL MEDICINE
Payer: COMMERCIAL

## 2021-12-28 VITALS
HEART RATE: 85 BPM | SYSTOLIC BLOOD PRESSURE: 142 MMHG | HEIGHT: 63 IN | BODY MASS INDEX: 21.62 KG/M2 | WEIGHT: 122 LBS | DIASTOLIC BLOOD PRESSURE: 88 MMHG

## 2021-12-28 DIAGNOSIS — R42 DIZZINESS: ICD-10-CM

## 2021-12-28 DIAGNOSIS — R00.2 PALPITATIONS: ICD-10-CM

## 2021-12-28 PROCEDURE — 93306 TTE W/DOPPLER COMPLETE: CPT

## 2021-12-29 LAB
AORTIC ROOT: 2.7 CM
APICAL FOUR CHAMBER EJECTION FRACTION: 56 %
AV LVOT PEAK GRADIENT: 5 MMHG
AV PEAK GRADIENT: 6 MMHG
DOP CALC LVOT AREA: 2.54 CM2
DOP CALC LVOT DIAMETER: 1.8 CM
E WAVE DECELERATION TIME: 234 MS
FRACTIONAL SHORTENING: 27 % (ref 28–44)
INTERVENTRICULAR SEPTUM IN DIASTOLE (PARASTERNAL SHORT AXIS VIEW): 0.7 CM
LEFT ATRIUM AREA SYSTOLE SINGLE PLANE A4C: 19.2 CM2
LEFT INTERNAL DIMENSION IN SYSTOLE: 3.3 CM (ref 2.1–4)
LEFT VENTRICLE DIASTOLIC VOLUME (MOD BIPLANE): 149 ML
LEFT VENTRICLE SYSTOLIC VOLUME (MOD BIPLANE): 67 ML
LEFT VENTRICULAR INTERNAL DIMENSION IN DIASTOLE: 4.5 CM (ref 3.7–5.51)
LEFT VENTRICULAR POSTERIOR WALL IN END DIASTOLE: 0.8 CM
LEFT VENTRICULAR STROKE VOLUME: 51 ML
LV EF: 55 %
MV E'TISSUE VEL-LAT: 17 CM/S
MV E'TISSUE VEL-SEP: 14 CM/S
MV PEAK A VEL: 0.56 M/S
MV PEAK E VEL: 102 CM/S
MV STENOSIS PRESSURE HALF TIME: 0 MS
PV PEAK GRADIENT: 3 MMHG
RIGHT ATRIUM AREA SYSTOLE A4C: 15.3 CM2
RIGHT VENTRICLE ID DIMENSION: 3.1 CM
SL CV LV EF: 55
SL CV PED ECHO LEFT VENTRICLE DIASTOLIC VOLUME (MOD BIPLANE) 2D: 95 ML
SL CV PED ECHO LEFT VENTRICLE SYSTOLIC VOLUME (MOD BIPLANE) 2D: 44 ML
TRICUSPID VALVE PEAK REGURGITATION VELOCITY: 2.02 M/S
TRICUSPID VALVE S': 1 CM/S
TV PEAK GRADIENT: 16 MMHG
Z-SCORE OF LEFT VENTRICULAR DIMENSION IN END SYSTOLE: -0.03

## 2021-12-29 PROCEDURE — 93306 TTE W/DOPPLER COMPLETE: CPT | Performed by: INTERNAL MEDICINE

## 2022-03-02 ENCOUNTER — OFFICE VISIT (OUTPATIENT)
Dept: FAMILY MEDICINE CLINIC | Facility: CLINIC | Age: 27
End: 2022-03-02
Payer: COMMERCIAL

## 2022-03-02 VITALS
DIASTOLIC BLOOD PRESSURE: 70 MMHG | TEMPERATURE: 97.9 F | WEIGHT: 124 LBS | HEIGHT: 63 IN | SYSTOLIC BLOOD PRESSURE: 116 MMHG | BODY MASS INDEX: 21.97 KG/M2 | RESPIRATION RATE: 16 BRPM | HEART RATE: 95 BPM | OXYGEN SATURATION: 98 %

## 2022-03-02 DIAGNOSIS — R00.2 PALPITATIONS: Primary | ICD-10-CM

## 2022-03-02 PROCEDURE — 3008F BODY MASS INDEX DOCD: CPT | Performed by: INTERNAL MEDICINE

## 2022-03-02 PROCEDURE — 1036F TOBACCO NON-USER: CPT | Performed by: INTERNAL MEDICINE

## 2022-03-02 PROCEDURE — 99213 OFFICE O/P EST LOW 20 MIN: CPT | Performed by: INTERNAL MEDICINE

## 2022-03-02 RX ORDER — ACETAMINOPHEN AND CODEINE PHOSPHATE 300; 30 MG/1; MG/1
TABLET ORAL
COMMUNITY
Start: 2021-12-20

## 2022-03-02 NOTE — PROGRESS NOTES
Assessment/Plan:    1  Palpitations  Assessment & Plan: We reviewed her echo, this was unrevealing  Will check labs, requests referral to cardiologist   [facility] for any worsening or prolonged symptoms  Orders:  -     TSH, 3rd generation with Free T4 reflex; Future  -     CBC and differential; Future  -     Comprehensive metabolic panel; Future  -     Ambulatory Referral to Cardiology; Future          There are no Patient Instructions on file for this visit  No follow-ups on file  Subjective:      Patient ID: Arianna Medeiros is a 23 y o  female  Chief Complaint   Patient presents with    Follow-up     Echo mz cma       Here with continued palpitations, associated with nausea and dizziness  This happens quite frequently  She has not passed out  She denies anxiety symptoms  Has seen cardiologist in her teens  She has cut back on caffeine in order to try to help  The following portions of the patient's history were reviewed and updated as appropriate: allergies, current medications, past family history, past medical history, past social history, past surgical history and problem list     Review of Systems   Constitutional: Negative  Respiratory: Negative  Cardiovascular: Positive for palpitations  Neurological: Positive for light-headedness  Current Outpatient Medications   Medication Sig Dispense Refill    acetaminophen-codeine (TYLENOL #3) 300-30 mg per tablet TAKE ONE TABLET  EVERY 4 TO 6 HOURS AS NEEDED FOR PAIN      Multiple Vitamin (multivitamin) tablet Take 1 tablet by mouth daily      Sprintec 28 0 25-35 MG-MCG per tablet TAKE ONE TABLET ONCE DAILY BY MOUTH 84 tablet 1     No current facility-administered medications for this visit  Objective:    /70   Pulse 95   Temp 97 9 °F (36 6 °C)   Resp 16   Ht 5' 3" (1 6 m)   Wt 56 2 kg (124 lb)   SpO2 98%   BMI 21 97 kg/m²        Physical Exam  Constitutional:       Appearance: She is well-developed  Eyes:      Conjunctiva/sclera: Conjunctivae normal    Neck:      Thyroid: No thyromegaly  Vascular: No JVD  Cardiovascular:      Rate and Rhythm: Normal rate and regular rhythm  Heart sounds: Normal heart sounds  No murmur heard  No friction rub  No gallop  Pulmonary:      Effort: Pulmonary effort is normal       Breath sounds: Normal breath sounds  No wheezing or rales  Abdominal:      General: Bowel sounds are normal  There is no distension  Palpations: Abdomen is soft  Tenderness: There is no abdominal tenderness  Musculoskeletal:      Cervical back: Neck supple                  Townsend Schlatter, MD

## 2022-03-02 NOTE — ASSESSMENT & PLAN NOTE
We reviewed her echo, this was unrevealing  Will check labs, requests referral to cardiologist   [facility] for any worsening or prolonged symptoms

## 2022-03-09 LAB
ALBUMIN SERPL-MCNC: 4.4 G/DL (ref 3.9–5)
ALBUMIN/GLOB SERPL: 2.2 {RATIO} (ref 1.2–2.2)
ALP SERPL-CCNC: 55 IU/L (ref 44–121)
ALT SERPL-CCNC: 15 IU/L (ref 0–32)
AST SERPL-CCNC: 15 IU/L (ref 0–40)
BASOPHILS # BLD AUTO: 0 X10E3/UL (ref 0–0.2)
BASOPHILS NFR BLD AUTO: 1 %
BILIRUB SERPL-MCNC: 0.4 MG/DL (ref 0–1.2)
BUN SERPL-MCNC: 7 MG/DL (ref 6–20)
BUN/CREAT SERPL: 8 (ref 9–23)
CALCIUM SERPL-MCNC: 8.8 MG/DL (ref 8.7–10.2)
CHLORIDE SERPL-SCNC: 103 MMOL/L (ref 96–106)
CO2 SERPL-SCNC: 22 MMOL/L (ref 20–29)
CREAT SERPL-MCNC: 0.89 MG/DL (ref 0.57–1)
EGFR: 92 ML/MIN/1.73
EOSINOPHIL # BLD AUTO: 0.1 X10E3/UL (ref 0–0.4)
EOSINOPHIL NFR BLD AUTO: 2 %
ERYTHROCYTE [DISTWIDTH] IN BLOOD BY AUTOMATED COUNT: 11.8 % (ref 11.7–15.4)
GLOBULIN SER-MCNC: 2 G/DL (ref 1.5–4.5)
GLUCOSE SERPL-MCNC: 94 MG/DL (ref 65–99)
HCT VFR BLD AUTO: 36.1 % (ref 34–46.6)
HGB BLD-MCNC: 12.2 G/DL (ref 11.1–15.9)
IMM GRANULOCYTES # BLD: 0 X10E3/UL (ref 0–0.1)
IMM GRANULOCYTES NFR BLD: 0 %
LYMPHOCYTES # BLD AUTO: 1.5 X10E3/UL (ref 0.7–3.1)
LYMPHOCYTES NFR BLD AUTO: 30 %
MCH RBC QN AUTO: 30.4 PG (ref 26.6–33)
MCHC RBC AUTO-ENTMCNC: 33.8 G/DL (ref 31.5–35.7)
MCV RBC AUTO: 90 FL (ref 79–97)
MICRODELETION SYND BLD/T FISH: NORMAL
MONOCYTES # BLD AUTO: 0.2 X10E3/UL (ref 0.1–0.9)
MONOCYTES NFR BLD AUTO: 4 %
NEUTROPHILS # BLD AUTO: 3 X10E3/UL (ref 1.4–7)
NEUTROPHILS NFR BLD AUTO: 63 %
PLATELET # BLD AUTO: 264 X10E3/UL (ref 150–450)
POTASSIUM SERPL-SCNC: 4.4 MMOL/L (ref 3.5–5.2)
PROT SERPL-MCNC: 6.4 G/DL (ref 6–8.5)
RBC # BLD AUTO: 4.01 X10E6/UL (ref 3.77–5.28)
SODIUM SERPL-SCNC: 139 MMOL/L (ref 134–144)
TSH SERPL DL<=0.005 MIU/L-ACNC: 1.79 UIU/ML (ref 0.45–4.5)
WBC # BLD AUTO: 4.8 X10E3/UL (ref 3.4–10.8)

## 2022-04-05 ENCOUNTER — OFFICE VISIT (OUTPATIENT)
Dept: FAMILY MEDICINE CLINIC | Facility: CLINIC | Age: 27
End: 2022-04-05
Payer: COMMERCIAL

## 2022-04-05 VITALS
BODY MASS INDEX: 21.97 KG/M2 | RESPIRATION RATE: 16 BRPM | HEIGHT: 63 IN | HEART RATE: 84 BPM | DIASTOLIC BLOOD PRESSURE: 70 MMHG | WEIGHT: 124 LBS | SYSTOLIC BLOOD PRESSURE: 110 MMHG | TEMPERATURE: 97 F

## 2022-04-05 DIAGNOSIS — R09.81 SINUS CONGESTION: Primary | ICD-10-CM

## 2022-04-05 PROCEDURE — 1036F TOBACCO NON-USER: CPT | Performed by: NURSE PRACTITIONER

## 2022-04-05 PROCEDURE — 3008F BODY MASS INDEX DOCD: CPT | Performed by: NURSE PRACTITIONER

## 2022-04-05 PROCEDURE — 99213 OFFICE O/P EST LOW 20 MIN: CPT | Performed by: NURSE PRACTITIONER

## 2022-04-05 RX ORDER — METHYLPREDNISOLONE 4 MG/1
TABLET ORAL
Qty: 21 TABLET | Refills: 0 | Status: SHIPPED | OUTPATIENT
Start: 2022-04-05

## 2022-04-05 NOTE — PATIENT INSTRUCTIONS
Take medication with food  It is important that you take the entire course of antibiotics prescribed  May also take a probiotic of your choice to maintain healthy GI angelica  Can take some probiotic and yogurt with the medication  Supportive care discussed and advised  Advised to RTO for any worsening and no improvement  Follow up for no improvement and worsening of conditions  Patient advised and educated when to see immediate medical care

## 2022-04-05 NOTE — PROGRESS NOTES
Assessment/Plan:    1  Sinus congestion  -     methylPREDNISolone 4 MG tablet therapy pack; Use as directed on package          BMI Counseling: Body mass index is 21 97 kg/m²  Discussed the patient's BMI with her  Patient Instructions: Take medication with food  It is important that you take the entire course of antibiotics prescribed  May also take a probiotic of your choice to maintain healthy GI angelica  Can take some probiotic and yogurt with the medication  Supportive care discussed and advised  Advised to RTO for any worsening and no improvement  Follow up for no improvement and worsening of conditions  Patient advised and educated when to see immediate medical care  Return if symptoms worsen or fail to improve  No future appointments  Subjective:      Patient ID: Buddy Mckinnon is a 23 y o  female  Chief Complaint   Patient presents with    Cough     Cough, congestion, sore throat which started one week ago [id number]         Vitals:  /70   Pulse 84   Temp (!) 97 °F (36 1 °C)   Resp 16   Ht 5' 3" (1 6 m)   Wt 56 2 kg (124 lb)   LMP  (Within Weeks) Comment: 3 weeks ago  BMI 21 97 kg/m²     HPI  Patient stated that started with congestion, ear pain and cough on 3/28/2022  Denies fever, chills and sob  Stated that both her kids were sick with ear infection  Denies covid-19 concerns      The following portions of the patient's history were reviewed and updated as appropriate: allergies, current medications, past family history, past medical history, past social history, past surgical history and problem list       Review of Systems   Constitutional: Negative for chills, diaphoresis, fatigue, fever and unexpected weight change  HENT: Positive for congestion and ear pain   Negative for dental problem, drooling, ear discharge, facial swelling, hearing loss, mouth sores, nosebleeds, postnasal drip, rhinorrhea, sinus pressure, sinus pain, sneezing, sore throat, tinnitus, trouble swallowing and voice change  Respiratory: Positive for cough  Negative for chest tightness, shortness of breath and wheezing  Cardiovascular: Negative  Gastrointestinal: Negative for abdominal pain, constipation, diarrhea, nausea and vomiting  Musculoskeletal: Negative  Skin: Negative  Neurological: Negative for dizziness, light-headedness and headaches  Objective:    Social History     Tobacco Use   Smoking Status Never Smoker   Smokeless Tobacco Never Used       Allergies: Allergies   Allergen Reactions    Amoxicillin Anaphylaxis and Hives     As a child and was admitted to hosp because she "almost "         Current Outpatient Medications   Medication Sig Dispense Refill    Multiple Vitamin (multivitamin) tablet Take 1 tablet by mouth daily      Sprintec 28 0 25-35 MG-MCG per tablet TAKE ONE TABLET ONCE DAILY BY MOUTH 84 tablet 1    acetaminophen-codeine (TYLENOL #3) 300-30 mg per tablet TAKE ONE TABLET  EVERY 4 TO 6 HOURS AS NEEDED FOR PAIN (Patient not taking: Reported on 2022)      methylPREDNISolone 4 MG tablet therapy pack Use as directed on package 21 tablet 0     No current facility-administered medications for this visit  Physical Exam  Vitals reviewed  Constitutional:       Appearance: Normal appearance  She is well-developed  HENT:      Head: Normocephalic  Right Ear: Tympanic membrane, ear canal and external ear normal       Left Ear: Tympanic membrane, ear canal and external ear normal       Nose: Mucosal edema present  Right Sinus: No maxillary sinus tenderness or frontal sinus tenderness  Left Sinus: No maxillary sinus tenderness or frontal sinus tenderness  Mouth/Throat:      Mouth: No oral lesions  Pharynx: No oropharyngeal exudate or posterior oropharyngeal erythema  Cardiovascular:      Rate and Rhythm: Normal rate and regular rhythm  Heart sounds: Normal heart sounds     Pulmonary:      Effort: Pulmonary effort is normal       Breath sounds: Normal breath sounds  Musculoskeletal:         General: Normal range of motion  Cervical back: Neck supple  Lymphadenopathy:      Cervical:      Right cervical: No superficial or posterior cervical adenopathy  Left cervical: No superficial or posterior cervical adenopathy  Skin:     General: Skin is warm and dry  Neurological:      Mental Status: She is alert and oriented to person, place, and time  Psychiatric:         Behavior: Behavior normal          Thought Content:  Thought content normal          Judgment: Judgment normal                      HANNAH Mancilla

## 2022-05-17 ENCOUNTER — OFFICE VISIT (OUTPATIENT)
Dept: CARDIOLOGY CLINIC | Facility: CLINIC | Age: 27
End: 2022-05-17
Payer: COMMERCIAL

## 2022-05-17 VITALS
HEIGHT: 63 IN | WEIGHT: 125 LBS | DIASTOLIC BLOOD PRESSURE: 64 MMHG | HEART RATE: 92 BPM | BODY MASS INDEX: 22.15 KG/M2 | SYSTOLIC BLOOD PRESSURE: 124 MMHG

## 2022-05-17 DIAGNOSIS — R55 SYNCOPE, UNSPECIFIED SYNCOPE TYPE: ICD-10-CM

## 2022-05-17 DIAGNOSIS — R00.2 PALPITATIONS: Primary | ICD-10-CM

## 2022-05-17 PROCEDURE — 99213 OFFICE O/P EST LOW 20 MIN: CPT | Performed by: INTERNAL MEDICINE

## 2022-05-17 PROCEDURE — 93000 ELECTROCARDIOGRAM COMPLETE: CPT | Performed by: INTERNAL MEDICINE

## 2022-05-17 PROCEDURE — 1036F TOBACCO NON-USER: CPT | Performed by: INTERNAL MEDICINE

## 2022-05-17 PROCEDURE — 3008F BODY MASS INDEX DOCD: CPT | Performed by: INTERNAL MEDICINE

## 2022-05-17 NOTE — PROGRESS NOTES
[facility]  [address] [place] [address] [address]   [place], [address]    Cardiology Consultation    Nel Recinos  [id number]  1995      Consult for: Palpitations  Appreciate consult by: Harris Hercules MD      Discussion/Summary:     1  Palpitations  POCT ECG    Tilt table   2  Syncope, unspecified syncope type  Tilt table      Recent echocardiogram was reviewed  She has no structural heart disease  Findings are consistent with postural tachycardia syndrome  She has had episodes of syncope which she did not have in the past   Will schedule for tilt-table study to confirm diagnosis versus orthostatic hypotension  Encouraged to keep well hydrated, use compression stocking and intake salt intake  She should continue to exercise regularly  If she does feel dizzy or lightheadedness with exercise, she should use rowing machine or recumbent bike  HPI:     Nel Recinos is a 33 y o  here for evaluation of palpitations  Patient is said longstanding history of palpitations  Recently, symptoms have worsened and she had episodes of syncope  Syncopal episode occurred as she was doing housework  She feels lightheadedness as she goes from sitting to standing at times  She is able to exercise regularly  She denies any chest pain or shortness of breath  She describes episodes of palpitations which she feels as a racing heartbeat  She had workup done in the past including Holter monitor and echocardiogram   Holter monitor showed episodes of tachycardia and lightheadedness as she stands  There are no significant arrhythmias on Holter monitor  She did have tachycardia and hypotension noted in office in 2019  Dr Kelli Castaneda recommended hydration, compression stockings and increase salt intake  She does not use compression stockings but does remain hydrated and has increased dietary salt  She did have improvement in symptoms while she was pregnant      Past Medical History: Diagnosis Date    Carpal tunnel syndrome     with pregnancy    Depression 2017    POTS (postural orthostatic tachycardia syndrome)     Varicella      Social History     Tobacco Use    Smoking status: Never Smoker    Smokeless tobacco: Never Used   Vaping Use    Vaping Use: Never used   Substance Use Topics    Alcohol use: Yes     Comment: occ    Drug use: Never      Family History   Problem Relation Age of Onset    Anxiety disorder Mother     Anemia Mother     Hypertension Mother     Hypotension Mother     Ovarian cancer Mother 29    Bipolar disorder Father     Arthritis Father     Hypertension Father     Hyperlipidemia Father     No Known Problems Sister     Hypertension Maternal Grandmother     Diabetes Paternal Grandmother     Stroke Paternal Grandmother     No Known Problems Daughter     Premature birth Daughter     Premature birth Daughter      Past Surgical History:   Procedure Laterality Date    PALATE SURGERY      NC  DELIVERY ONLY N/A 2020    Procedure:  SECTION (); Surgeon: Jordyn Dorado MD;  Location: [facility];  Service: Obstetrics       Current Outpatient Medications:     Multiple Vitamin (multivitamin) tablet, Take 1 tablet by mouth daily, Disp: , Rfl:     Sprintec 28 0 25-35 MG-MCG per tablet, TAKE ONE TABLET ONCE DAILY BY MOUTH, Disp: 84 tablet, Rfl: 1    acetaminophen-codeine (TYLENOL #3) 300-30 mg per tablet, TAKE ONE TABLET  EVERY 4 TO 6 HOURS AS NEEDED FOR PAIN (Patient not taking: No sig reported), Disp: , Rfl:     methylPREDNISolone 4 MG tablet therapy pack, Use as directed on package (Patient not taking: Reported on 2022), Disp: 21 tablet, Rfl: 0  Allergies   Allergen Reactions    Amoxicillin Anaphylaxis and Hives     As a child and was admitted to hosp because she "almost "       Review of Systems:   Review of Systems   Respiratory: Negative for shortness of breath  Cardiovascular: Positive for palpitations  Negative for chest pain and leg swelling  Neurological: Positive for dizziness and light-headedness  All other systems reviewed and are negative  Physical Examination:     Vitals:    05/17/22 1525   BP: 124/64   BP Location: Left arm   Cuff Size: Adult   Pulse: 92   Weight: 56 7 kg (125 lb)   Height: 5' 3" (1 6 m)       Physical Exam   Constitutional: She appears healthy  No distress  Eyes: Pupils are equal, round, and reactive to light  Conjunctivae are normal    Neck: No JVD present  Cardiovascular: Normal rate, regular rhythm and normal heart sounds  Exam reveals no gallop and no friction rub  No murmur heard  Pulmonary/Chest: Effort normal and breath sounds normal  She has no wheezes  She has no rales  Musculoskeletal:         General: No tenderness, deformity or edema  Cervical back: Normal range of motion and neck supple  Neurological: She is alert and oriented to person, place, and time  Skin: Skin is warm and dry          Labs:     Lab Results   Component Value Date    WBC 4 8 03/09/2022    HGB 12 2 03/09/2022    HCT 36 1 03/09/2022    MCV 90 03/09/2022    RDW 11 8 03/09/2022     03/09/2022     BMP:  Lab Results   Component Value Date    SODIUM 139 03/09/2022    K 4 4 03/09/2022     03/09/2022    CO2 22 03/09/2022    BUN 7 03/09/2022    CREATININE 0 89 03/09/2022    GLUC 94 03/09/2022    CALCIUM 7 6 (L) 07/10/2020    EGFR 92 03/09/2022     LFT:  Lab Results   Component Value Date    AST 15 03/09/2022    ALT 15 03/09/2022    ALKPHOS 147 (H) 07/10/2020    TP 6 4 03/09/2022    ALB 4 4 03/09/2022      Lab Results   Component Value Date    ZBN3MNZRQMKH 1 840 02/15/2016     No results found for: HGBA1C  Lipid Profile:   No results found for: CHOLESTEROL, HDL, LDLCALC, TRIG  No results found for: CHOLESTEROL  No results found for: CKTOTAL, CKMB, CKMBINDEX, TROPONINI  No results found for: NTBNP   No results found for this or any previous visit (from the past 672 hour(s))  Imaging & Testing   I have personally reviewed pertinent reports  Cardiac Testing   Results for orders placed during the hospital encounter of 16    Echo complete with contrast if indicated    Narrative  [address]  [address] [facility]  [place] [phone]    Transthoracic Echocardiogram  2D, M-mode, Doppler, and Color Doppler    Study date:  2016    Patient: Elodia Mcallister  MR number: [id number]  Account number: [de-identified]  : 1995  Age: 24 years  Gender: Female  Status: Routine  Location: Echo lab  Height: 63 in  Weight: 124 7 lb  BP: 111/ 72 mmHg    Indications: syncope    Diagnoses: R55  - Syncope and collapse    Sonographer:  LIZZETTE Logan  Primary Physician:  Cordell Allen DO  Referring Physician:  Nehemiah Reno MD  Group:  [facility]  Interpreting Physician:  Nehemiah Reno MD    SUMMARY    LEFT VENTRICLE:  Systolic function was normal by Salomon Henson  Ejection fraction was estimated in  the range of 60 % to 65 % to be 62 %  There were no regional wall motion abnormalities  Left ventricular diastolic function parameters were normal     IVC, HEPATIC VEINS:  The inferior vena cava was normal in size  HISTORY: PRIOR HISTORY: Patient has no history of cardiovascular disease  Risk  factors: hypertension  PROCEDURE: The procedure was performed in the echo lab  This was a routine  study  The transthoracic approach was used  The study included complete 2D  imaging, M-mode, complete spectral Doppler, and color Doppler  The heart rate  was 75 bpm, at the start of the study  Images were obtained from the  parasternal, apical, subcostal, and suprasternal notch acoustic windows  Image  quality was adequate  LEFT VENTRICLE: Size was normal  Systolic function was normal by Amy  Ejection fraction was estimated in the range of 60 % to 65 % to be 62 %  There  were no regional wall motion abnormalities   Wall thickness was normal  No  evidence of apical thrombus  DOPPLER: Left ventricular diastolic function  parameters were normal     RIGHT VENTRICLE: The size was normal  Systolic function was normal  Wall  thickness was normal     LEFT ATRIUM: Size was at the upper limits of normal     RIGHT ATRIUM: Size was normal     MITRAL VALVE: Valve structure was normal  There was normal leaflet separation  DOPPLER: The transmitral velocity was within the normal range  There was no  evidence for stenosis  There was no significant regurgitation  AORTIC VALVE: The valve was trileaflet  Leaflets exhibited normal thickness and  normal cuspal separation  DOPPLER: Transaortic velocity was within the normal  range  There was no evidence for stenosis  There was no significant  regurgitation  TRICUSPID VALVE: The valve structure was normal  There was normal leaflet  separation  DOPPLER: The transtricuspid velocity was within the normal range  There was no evidence for stenosis  There was no significant regurgitation  PULMONIC VALVE: Leaflets exhibited normal thickness, no calcification, and  normal cuspal separation  DOPPLER: The transpulmonic velocity was within the  normal range  There was no significant regurgitation  PERICARDIUM: There was no pericardial effusion  The pericardium was normal in  appearance  AORTA: The root exhibited normal size  SYSTEMIC VEINS: IVC: The inferior vena cava was normal in size      SYSTEM MEASUREMENT TABLES    2D mode  AoR Diam 2D: 2 5 cm  LA Diam (2D): 3 3 cm  LA/Ao (2D): 1 32  FS (2D Teich): 17 8 %  IVSd (2D): 0 68 cm  LVDEV: 71 7 cm³  LVEDV MOD BP: 126 cm³  LVESV: 44 8 cm³  LVIDd(2D): 4 04 cm  LVISd (2D): 3 32 cm  LVOT Area 2D: 2 84 cm squared  LVPWd (2D): 0 79 cm  SV (Teich): 26 9 cm³    Apical four chamber  LVEF A4C: 63 %    Apical two chamber  LA Area: 14 5 cm squared  LA Volume: 36 cm³  LVEF A2C: 62 %    Unspecified Scan Mode  MARLENY Cont Eq (Peak Gabriel): 2 67 cm squared  LVOT Diam : 1 9 cm  LVOT Vmax: 1240 mm/s  LVOT Vmax; Mean: 1240 mm/s  Peak Grad ; Mean: 6 mm[Hg]  MV Peak A Gabriel: 469 mm/s  MV Peak E Gabriel  Mean: 1100 mm/s  MVA (PHT): 6 47 cm squared  PHT: 34 ms  Max P mm[Hg]  Max P mm[Hg]  V Max: 1890 mm/s  Vmax: 1890 mm/s  RA Area: 12 7 cm squared  RA Volume: 31 9 cm³  TAPSE: 3 4 cm    [facility] Accredited Echocardiography Laboratory    Prepared and electronically signed by    Ferny Miranda MD  Signed [id number] 10:13:59    EKG: Personally reviewed  Normal ECG with rate 90 beats min      Jhoana Hsu DO, [place]  [phone]  Please call with any questions

## 2022-05-18 ENCOUNTER — TELEPHONE (OUTPATIENT)
Dept: CARDIOLOGY CLINIC | Facility: CLINIC | Age: 27
End: 2022-05-18

## 2022-05-18 NOTE — TELEPHONE ENCOUNTER
Left message for patient to call back tilt table set up for June 14 at 9 am   No covid test needed for tilt table

## 2022-09-10 NOTE — LACTATION NOTE
Mom continues to pump for her infants in NICU and is obtaining good amounts of colostrum  Breasts are filling  Reviewed triple feeds, basics of breastfeeding twins, maintaining milk supply via pump, engorgement relief measures and when and where to call for additional assistance as needed  Encouraged follow up appointment at [facility] once infants are home  Given discharge breastfeeding pkat and same reviewed  Eat healthy foods you enjoy. Apixaban/Eliquis DOES NOT have a special diet. Limit your alcohol intake.

## 2024-05-05 NOTE — LETTER
NST sleeve cover sheet    Patient name: Isabel Richardson  : 1995  MRN: [id number]    MILADY: Estimated Date of Delivery: 20    Obstetrician: _______[facility]________    Reason(s) for testing:  _________________mono/di twins_________________________      Testing frequency:    __x_ 2x/wk  ___ 1x/wk  ___ Dopplers  ___ BPP?       Last growth scan: __________________________________________ [facility]  ED Nurse Handoff Report    ED Chief complaint: Diarrhea, Nausea, and Abdominal Pain  . ED Diagnosis:   Final diagnoses:   Nausea vomiting and diarrhea   Hyponatremia       Allergies:   Allergies   Allergen Reactions    Gabapentin Swelling    Contrast Dye      Iodine    Escitalopram      Nausea and sickness    Peanuts [Nuts] Hives    Penicillins      rash    Sulfa Antibiotics      High family history    Tace [Chlorotrianisene]      joint swelling    Aspirin Rash     After 3 days    Ibuprofen Nausea and Vomiting and Swelling    Strawberry Extract Rash       Code Status: Full Code    Activity level - Baseline/Home:  walker.  Activity Level - Current:   assist of 1.   Lift room needed: No.   Bariatric: No   Needed: No   Isolation: Yes.   Infection:   C-Diff Pending.     Respiratory status: Room air    Vital Signs (within 30 minutes):   Vitals:    05/05/24 1015 05/05/24 1030 05/05/24 1045 05/05/24 1210   BP: 118/83 119/78 130/88 118/80   Pulse: 96 88 89    Resp:       Temp:       TempSrc:       SpO2: 95% 95% 95% 96%   Weight:       Height:           Cardiac Rhythm:  ,      Pain level:    Patient confused: No.   Patient Falls Risk: nonskid shoes/slippers when out of bed and patient and family education.   Elimination Status: Has voided     Patient Report - Initial Complaint: diarrhea.       Abnormal Results:   Labs Ordered and Resulted from Time of ED Arrival to Time of ED Departure   COMPREHENSIVE METABOLIC PANEL - Abnormal       Result Value    Sodium 127 (*)     Potassium 3.6      Carbon Dioxide (CO2) 21 (*)     Anion Gap 13      Urea Nitrogen 4.1 (*)     Creatinine 0.47 (*)     GFR Estimate >90      Calcium 7.6 (*)     Chloride 93 (*)     Glucose 110 (*)     Alkaline Phosphatase 51      AST 10      ALT 7      Protein Total 4.9 (*)     Albumin 3.3 (*)     Bilirubin Total 0.4     CBC WITH PLATELETS AND DIFFERENTIAL - Abnormal    WBC Count 13.9 (*)     RBC Count 4.29       Hemoglobin 11.0 (*)     Hematocrit 36.7      MCV 86      MCH 25.6 (*)     MCHC 30.0 (*)     RDW 16.0 (*)     Platelet Count 434      % Neutrophils 81      % Lymphocytes 11      % Monocytes 7      % Eosinophils 0      % Basophils 0      % Immature Granulocytes 1      NRBCs per 100 WBC 0      Absolute Neutrophils 11.1 (*)     Absolute Lymphocytes 1.5      Absolute Monocytes 1.0      Absolute Eosinophils 0.1      Absolute Basophils 0.0      Absolute Immature Granulocytes 0.2      Absolute NRBCs 0.0     LIPASE - Normal    Lipase 14     C. DIFFICILE TOXIN B PCR WITH REFLEX TO C. DIFFICILE ANTIGEN AND TOXINS A/B EIA   ENTERIC BACTERIA AND VIRUS PANEL BY PCR        CT Abdomen Pelvis w Contrast   Final Result   IMPRESSION:    1.  Mild wall thickening of the descending and rectosigmoid colon, which could be seen with colitis (infectious, inflammatory, or nonembolic ischemic).   2.  Otherwise, no acute abnormality in the abdomen or pelvis.   3.  Unchanged 6 mm left lower lobe nodule.   4.  Additional details as described in the findings.          Treatments provided: see MAR  Family Comments: n/a  OBS brochure/video discussed/provided to patient:  Yes  ED Medications:   Medications   ondansetron (ZOFRAN) injection 4 mg (has no administration in time range)   HYDROmorphone (PF) (DILAUDID) injection 0.5 mg (0.5 mg Intravenous $Given 5/5/24 1210)   alum & mag hydroxide-simethicone (MAALOX) suspension 15 mL (has no administration in time range)   diphenhydrAMINE (BENADRYL) injection 25 mg (25 mg Intravenous $Given 5/5/24 0954)   HYDROmorphone (PF) (DILAUDID) injection 0.5 mg (0.5 mg Intravenous $Given 5/5/24 0954)   sodium chloride 0.9% BOLUS 1,000 mL (1,000 mLs Intravenous $New Bag 5/5/24 1034)   iopamidol (ISOVUE-370) solution 500 mL (85 mLs Intravenous $Given 5/5/24 1058)   sodium chloride (PF) 0.9% PF flush 100 mL (50 mLs Intravenous $Given 5/5/24 1058)       Drips infusing:  No  For the majority of the shift this patient was  Green.   Interventions performed were n/a.    Sepsis treatment initiated: No    Cares/treatment/interventions/medications to be completed following ED care: see orders    ED Nurse Name: Mounika Palacios RN  12:46 PM  RECEIVING UNIT ED HANDOFF REVIEW    Above ED Nurse Handoff Report was reviewed: Yes  Reviewed by: Shital Marquez RN on May 5, 2024 at 3:35 PM

## (undated) DEVICE — GLOVE SRG LF STRL BGL SKNSNS 7 PF

## (undated) DEVICE — ABDOMINAL PAD: Brand: DERMACEA

## (undated) DEVICE — SUT MONOCRYL 4-0 PS-2 27 IN Y426H

## (undated) DEVICE — Device

## (undated) DEVICE — TELFA NON-ADHERENT ABSORBENT DRESSING: Brand: TELFA

## (undated) DEVICE — SUT VICRYL 0 CT-1 27 IN J260H

## (undated) DEVICE — SUT VICRYL 4-0 PS-2 27 IN J426H

## (undated) DEVICE — CHLORAPREP HI-LITE 26ML ORANGE

## (undated) DEVICE — SKIN MARKER DUAL TIP WITH RULER CAP, FLEXIBLE RULER AND LABELS: Brand: DEVON

## (undated) DEVICE — GAUZE SPONGES,16 PLY: Brand: CURITY

## (undated) DEVICE — GLOVE INDICATOR PI UNDERGLOVE SZ 7 BLUE

## (undated) DEVICE — SUT MONOCRYL 0 CTX 36 IN Y398H

## (undated) DEVICE — SUT VICRYL 0 CT-1 36 IN J946H

## (undated) DEVICE — PACK C-SECTION PBDS